# Patient Record
Sex: MALE | Race: WHITE | NOT HISPANIC OR LATINO | URBAN - METROPOLITAN AREA
[De-identification: names, ages, dates, MRNs, and addresses within clinical notes are randomized per-mention and may not be internally consistent; named-entity substitution may affect disease eponyms.]

---

## 2021-01-15 DIAGNOSIS — Z23 NEED FOR VACCINATION: ICD-10-CM

## 2022-01-31 ENCOUNTER — TELEPHONE (OUTPATIENT)
Dept: HEALTH INFORMATION MANAGEMENT | Facility: OTHER | Age: 77
End: 2022-01-31

## 2022-03-11 ENCOUNTER — TELEPHONE (OUTPATIENT)
Dept: SCHEDULING | Facility: IMAGING CENTER | Age: 77
End: 2022-03-11

## 2022-04-21 ENCOUNTER — TELEPHONE (OUTPATIENT)
Dept: MEDICAL GROUP | Facility: PHYSICIAN GROUP | Age: 77
End: 2022-04-21
Payer: MEDICARE

## 2022-04-27 ENCOUNTER — TELEPHONE (OUTPATIENT)
Dept: HEMATOLOGY ONCOLOGY | Facility: MEDICAL CENTER | Age: 77
End: 2022-04-27

## 2022-04-27 ENCOUNTER — OFFICE VISIT (OUTPATIENT)
Dept: MEDICAL GROUP | Facility: PHYSICIAN GROUP | Age: 77
End: 2022-04-27
Payer: MEDICARE

## 2022-04-27 VITALS
DIASTOLIC BLOOD PRESSURE: 68 MMHG | HEART RATE: 69 BPM | HEIGHT: 73 IN | TEMPERATURE: 98.2 F | BODY MASS INDEX: 23.99 KG/M2 | OXYGEN SATURATION: 98 % | WEIGHT: 181 LBS | SYSTOLIC BLOOD PRESSURE: 128 MMHG

## 2022-04-27 DIAGNOSIS — Z76.89 ENCOUNTER TO ESTABLISH CARE: ICD-10-CM

## 2022-04-27 DIAGNOSIS — Z72.0 TOBACCO USE: ICD-10-CM

## 2022-04-27 DIAGNOSIS — Z13.6 SCREENING FOR CARDIOVASCULAR CONDITION: ICD-10-CM

## 2022-04-27 DIAGNOSIS — Z12.11 COLON CANCER SCREENING: ICD-10-CM

## 2022-04-27 DIAGNOSIS — B02.23 POST-HERPETIC POLYNEUROPATHY: ICD-10-CM

## 2022-04-27 DIAGNOSIS — Z23 NEED FOR VACCINATION: ICD-10-CM

## 2022-04-27 PROCEDURE — G0009 ADMIN PNEUMOCOCCAL VACCINE: HCPCS | Performed by: NURSE PRACTITIONER

## 2022-04-27 PROCEDURE — 99204 OFFICE O/P NEW MOD 45 MIN: CPT | Mod: 25 | Performed by: NURSE PRACTITIONER

## 2022-04-27 PROCEDURE — 90732 PPSV23 VACC 2 YRS+ SUBQ/IM: CPT | Performed by: NURSE PRACTITIONER

## 2022-04-27 RX ORDER — GABAPENTIN 100 MG/1
100 CAPSULE ORAL 2 TIMES DAILY PRN
Qty: 15 CAPSULE | Refills: 0 | Status: SHIPPED | OUTPATIENT
Start: 2022-04-27 | End: 2022-05-02

## 2022-04-27 RX ORDER — VALACYCLOVIR HYDROCHLORIDE 1 G/1
1000 TABLET, FILM COATED ORAL 2 TIMES DAILY
Qty: 14 TABLET | Refills: 0 | Status: SHIPPED | OUTPATIENT
Start: 2022-04-27 | End: 2022-05-17 | Stop reason: SDUPTHER

## 2022-04-27 ASSESSMENT — ENCOUNTER SYMPTOMS
SPUTUM PRODUCTION: 0
NEUROLOGICAL NEGATIVE: 1
SHORTNESS OF BREATH: 0
MUSCULOSKELETAL NEGATIVE: 1
COUGH: 0
GASTROINTESTINAL NEGATIVE: 1
CONSTITUTIONAL NEGATIVE: 1
EYES NEGATIVE: 1
PSYCHIATRIC NEGATIVE: 1
FEVER: 0
PALPITATIONS: 0

## 2022-04-27 ASSESSMENT — PATIENT HEALTH QUESTIONNAIRE - PHQ9: CLINICAL INTERPRETATION OF PHQ2 SCORE: 0

## 2022-04-27 NOTE — ASSESSMENT & PLAN NOTE
Initial rash outbreak 12/2021; no treatment received at the time. No open lesions visualized today, he does have a significant area of scarring from where rash was. We discussed completing course of valtrex and trial of low dose gabapentin for neuropathic pain. May need to extend gabapentin if pain is lingering.

## 2022-04-27 NOTE — PROGRESS NOTES
Subjective:     CC:    Chief Complaint   Patient presents with   • Establish Care     Shingles x 4 months         HISTORY OF THE PRESENT ILLNESS: Patient is a pleasant 76 y.o. male, here today to establish care. Prior PCP was none. He has no active complaints today. He has never been on prescription medications. He eats healthy and stays active with walking.  The below problems were discussed/reviewed at this visit:    Problem   Post-Herpetic Polyneuropathy    Reports initial shingles rash across left lower torso in 12/2021; he went to  and states did not receive any treatment at the time. States burning pain is improving but still feels it anterior part of torso.     Tobacco Use    59 pack year smoker. He is down to 2 cigs/day (at most he smoked 1 pack/day in the past). He expresses need to quit. States he tried nicotine patch before but it gave him bad headache and nightmares.  - counseled/encouraged cessation; educated on proper use of patches; recommend he use lowest dosing since he only smokes 2 cigs/day now; remove patch before bedtime to prevent overdose of nicotine which can cause headaches and disrupted sleep  - he is concerned about lung damage; he does meet criteria for lung cancer screen so I have sent in referral          Current Outpatient Medications Ordered in Epic   Medication Sig Dispense Refill   • valacyclovir (VALTREX) 1 GM Tab Take 1 Tablet by mouth 2 times a day for 7 days. 14 Tablet 0   • gabapentin (NEURONTIN) 100 MG Cap Take 1 Capsule by mouth 2 times a day as needed (painful rash) for up to 5 days. 15 Capsule 0     No current Epic-ordered facility-administered medications on file.        No past surgical history on file.     Allergies:  Patient has no known allergies.    Health Maintenance: Completed    ROS:   Review of Systems   Constitutional: Negative.  Negative for fever and malaise/fatigue.   HENT: Negative.    Eyes: Negative.    Respiratory: Negative for cough, sputum production  "and shortness of breath.    Cardiovascular: Negative for chest pain, palpitations and leg swelling.   Gastrointestinal: Negative.    Genitourinary: Negative.    Musculoskeletal: Negative.    Skin:        Painful rash left torso   Neurological: Negative.    Endo/Heme/Allergies: Negative.    Psychiatric/Behavioral: Negative.        Objective:     Exam: /68 (BP Location: Left arm, Patient Position: Sitting, BP Cuff Size: Adult)   Pulse 69   Temp 36.8 °C (98.2 °F) (Temporal)   Ht 1.854 m (6' 1\")   Wt 82.1 kg (181 lb)   SpO2 98%  Body mass index is 23.88 kg/m².    Physical Exam  Constitutional:       Appearance: Normal appearance.   Cardiovascular:      Rate and Rhythm: Normal rate and regular rhythm.      Pulses: Normal pulses.      Heart sounds: Normal heart sounds.   Pulmonary:      Effort: Pulmonary effort is normal.      Breath sounds: Normal breath sounds.   Musculoskeletal:         General: Normal range of motion.      Cervical back: Normal range of motion and neck supple.      Right lower leg: No edema.      Left lower leg: No edema.   Skin:     General: Skin is warm and dry.      Comments: Healing shingles rash across left lower torso; there are no visible open lesions; scarring noted    Neurological:      General: No focal deficit present.      Mental Status: He is alert and oriented to person, place, and time.   Psychiatric:         Mood and Affect: Mood normal.         Behavior: Behavior normal.         Thought Content: Thought content normal.         Judgment: Judgment normal.       Assessment & Plan:   76 y.o. male with the following -    Problem List Items Addressed This Visit     Post-herpetic polyneuropathy     Initial rash outbreak 12/2021; no treatment received at the time. No open lesions visualized today, he does have a significant area of scarring from where rash was. We discussed completing course of valtrex and trial of low dose gabapentin for neuropathic pain. May need to extend " gabapentin if pain is lingering.          Relevant Medications    valacyclovir (VALTREX) 1 GM Tab    gabapentin (NEURONTIN) 100 MG Cap    Tobacco use    Relevant Orders    REFERRAL TO LUNG CANCER SCREENING PROGRAM      Other Visit Diagnoses     Need for vaccination        I have placed the below orders and discussed them with an approved delegating provider.  The MA is performing the below orders under the direction of Dr Fonseca    Relevant Orders    Pneumovax Vaccine (PPSV23) (Completed)    Colon cancer screening        Relevant Orders    COLOGUARD (FIT DNA)    Screening for cardiovascular condition        Relevant Orders    Comp Metabolic Panel    Lipid Profile    Encounter to establish care          - valacyclovir (VALTREX) 1 GM Tab; Take 1 Tablet by mouth 2 times a day for 7 days.  Dispense: 14 Tablet; Refill: 0  - gabapentin (NEURONTIN) 100 MG Cap; Take 1 Capsule by mouth 2 times a day as needed (painful rash) for up to 5 days.  Dispense: 15 Capsule; Refill: 0    Educated in proper administration of medication(s) ordered today including safety, possible SE, risks, benefits, rationale and alternatives to therapy.     Return in about 4 weeks (around 5/25/2022) for shingles follow up.    Please note that this dictation was created using voice recognition software. I have made every reasonable attempt to correct obvious errors, but I expect that there are errors of grammar and possibly content that I did not discover before finalizing the note.

## 2022-04-30 ENCOUNTER — HOSPITAL ENCOUNTER (OUTPATIENT)
Dept: LAB | Facility: MEDICAL CENTER | Age: 77
End: 2022-04-30
Attending: NURSE PRACTITIONER
Payer: MEDICARE

## 2022-04-30 DIAGNOSIS — Z13.6 SCREENING FOR CARDIOVASCULAR CONDITION: ICD-10-CM

## 2022-04-30 LAB
ALBUMIN SERPL BCP-MCNC: 4.4 G/DL (ref 3.2–4.9)
ALBUMIN/GLOB SERPL: 1.6 G/DL
ALP SERPL-CCNC: 90 U/L (ref 30–99)
ALT SERPL-CCNC: 13 U/L (ref 2–50)
ANION GAP SERPL CALC-SCNC: 9 MMOL/L (ref 7–16)
AST SERPL-CCNC: 18 U/L (ref 12–45)
BILIRUB SERPL-MCNC: 0.5 MG/DL (ref 0.1–1.5)
BUN SERPL-MCNC: 19 MG/DL (ref 8–22)
CALCIUM SERPL-MCNC: 9.5 MG/DL (ref 8.5–10.5)
CHLORIDE SERPL-SCNC: 102 MMOL/L (ref 96–112)
CHOLEST SERPL-MCNC: 190 MG/DL (ref 100–199)
CO2 SERPL-SCNC: 25 MMOL/L (ref 20–33)
CREAT SERPL-MCNC: 1.07 MG/DL (ref 0.5–1.4)
FASTING STATUS PATIENT QL REPORTED: NORMAL
GFR SERPLBLD CREATININE-BSD FMLA CKD-EPI: 71 ML/MIN/1.73 M 2
GLOBULIN SER CALC-MCNC: 2.7 G/DL (ref 1.9–3.5)
GLUCOSE SERPL-MCNC: 84 MG/DL (ref 65–99)
HDLC SERPL-MCNC: 52 MG/DL
LDLC SERPL CALC-MCNC: 123 MG/DL
POTASSIUM SERPL-SCNC: 4.9 MMOL/L (ref 3.6–5.5)
PROT SERPL-MCNC: 7.1 G/DL (ref 6–8.2)
SODIUM SERPL-SCNC: 136 MMOL/L (ref 135–145)
TRIGL SERPL-MCNC: 76 MG/DL (ref 0–149)

## 2022-04-30 PROCEDURE — 80061 LIPID PANEL: CPT

## 2022-04-30 PROCEDURE — 80053 COMPREHEN METABOLIC PANEL: CPT

## 2022-04-30 PROCEDURE — 36415 COLL VENOUS BLD VENIPUNCTURE: CPT

## 2022-05-05 ENCOUNTER — OFFICE VISIT (OUTPATIENT)
Dept: HEMATOLOGY ONCOLOGY | Facility: MEDICAL CENTER | Age: 77
End: 2022-05-05
Payer: MEDICARE

## 2022-05-05 VITALS
RESPIRATION RATE: 16 BRPM | HEART RATE: 87 BPM | DIASTOLIC BLOOD PRESSURE: 58 MMHG | OXYGEN SATURATION: 97 % | TEMPERATURE: 98 F | SYSTOLIC BLOOD PRESSURE: 120 MMHG | WEIGHT: 182.87 LBS | HEIGHT: 73 IN | BODY MASS INDEX: 24.24 KG/M2

## 2022-05-05 DIAGNOSIS — F17.210 CIGARETTE SMOKER: ICD-10-CM

## 2022-05-05 PROCEDURE — G0296 VISIT TO DETERM LDCT ELIG: HCPCS | Performed by: NURSE PRACTITIONER

## 2022-05-05 ASSESSMENT — ENCOUNTER SYMPTOMS
SPUTUM PRODUCTION: 0
WHEEZING: 0
SHORTNESS OF BREATH: 0
WEIGHT LOSS: 0
HEMOPTYSIS: 0
COUGH: 1

## 2022-05-05 NOTE — PROGRESS NOTES
"Subjective     Gene DANNY Morgan is a 76 y.o. male who presents with Lung Cancer Screening Program Prescreen ([facility]/ [facility]/ Smoking hx/ Leisa Browne)            HPI   Patient seen today for initial lung cancer screening visit. Patient referred by PCP, Dr. John Paul Torres, for lung cancer screening shared decision making visit.    The patient meets eligibility criteria including age, smoking history (30+ pack years), if former smoker, quit in the last 15 years, and absence of signs or symptoms of lung cancer.    - Age - 76  - Smoking history - Patient has smoked for 59 years at an average of 1 ppd = 59 pack year smoking history.  - Current smoking status - current smoker  - No symptoms of lung cancer and no previous history of lung cancer       Review of Systems   Constitutional: Negative for malaise/fatigue and weight loss.   Respiratory: Positive for cough. Negative for hemoptysis, sputum production, shortness of breath and wheezing.      No Known Allergies        No current outpatient medications on file prior to visit.     No current facility-administered medications on file prior to visit.              Objective     /58 (BP Location: Right arm, Patient Position: Sitting, BP Cuff Size: Adult)   Pulse 87   Temp 36.7 °C (98 °F) (Temporal)   Resp 16   Ht 1.854 m (6' 0.99\")   Wt 83 kg (182 lb 14 oz)   SpO2 97%   BMI 24.13 kg/m²      Physical Exam  Vitals reviewed.   Constitutional:       General: He is not in acute distress.     Appearance: He is well-developed. He is not diaphoretic.   Cardiovascular:      Rate and Rhythm: Normal rate and regular rhythm.      Heart sounds: Normal heart sounds. No murmur heard.    No friction rub. No gallop.   Pulmonary:      Effort: Pulmonary effort is normal. No respiratory distress.      Breath sounds: Normal breath sounds. No wheezing.   Musculoskeletal:         General: Normal range of motion.   Skin:     General: Skin is warm and dry.   Neurological:      Mental Status: " He is alert and oriented to person, place, and time.              Assessment & Plan        1. Cigarette smoker  CT-LUNG CANCER-SCREENING       We conducted a shared decision-making process using a decision aid. We reviewed benefits and harms of screening, including false positives and potential need for additional diagnostic testing, the possibility of over diagnosis, and total radiation exposure.    We discussed the importance of adhering to annual LDCT screening. We also discussed the impact of comorbities on the patient's the ability or willingness to undergo diagnostic procedure(s) and treatment.    Counseling on the importance of maintaining cigarette smoking abstinence if former smoker; or the importance of smoking cessation if current smoker and, if appropriate, furnishing of information about tobacco cessation interventions. I provided patient with smoking cessation materials and resources within [facility] and the community. Patient appreciative of the resources.    Based on our discussion, we have decided to begin annual lung cancer screening starting now.

## 2022-05-16 ENCOUNTER — HOSPITAL ENCOUNTER (OUTPATIENT)
Dept: RADIOLOGY | Facility: MEDICAL CENTER | Age: 77
End: 2022-05-16
Attending: NURSE PRACTITIONER
Payer: MEDICARE

## 2022-05-16 DIAGNOSIS — F17.210 CIGARETTE SMOKER: ICD-10-CM

## 2022-05-16 PROCEDURE — 71271 CT THORAX LUNG CANCER SCR C-: CPT

## 2022-05-17 ENCOUNTER — OFFICE VISIT (OUTPATIENT)
Dept: MEDICAL GROUP | Facility: PHYSICIAN GROUP | Age: 77
End: 2022-05-17
Payer: MEDICARE

## 2022-05-17 ENCOUNTER — TELEPHONE (OUTPATIENT)
Dept: HEMATOLOGY ONCOLOGY | Facility: MEDICAL CENTER | Age: 77
End: 2022-05-17

## 2022-05-17 VITALS
SYSTOLIC BLOOD PRESSURE: 126 MMHG | BODY MASS INDEX: 24.79 KG/M2 | DIASTOLIC BLOOD PRESSURE: 78 MMHG | HEART RATE: 83 BPM | TEMPERATURE: 98 F | WEIGHT: 183 LBS | OXYGEN SATURATION: 96 % | HEIGHT: 72 IN

## 2022-05-17 DIAGNOSIS — B02.23 POST-HERPETIC POLYNEUROPATHY: ICD-10-CM

## 2022-05-17 DIAGNOSIS — Z72.0 TOBACCO USE: ICD-10-CM

## 2022-05-17 PROCEDURE — 99213 OFFICE O/P EST LOW 20 MIN: CPT | Performed by: NURSE PRACTITIONER

## 2022-05-17 RX ORDER — VALACYCLOVIR HYDROCHLORIDE 1 G/1
1000 TABLET, FILM COATED ORAL 2 TIMES DAILY
Qty: 14 TABLET | Refills: 0 | Status: SHIPPED | OUTPATIENT
Start: 2022-05-17 | End: 2022-05-24

## 2022-05-17 RX ORDER — GABAPENTIN 100 MG/1
100 CAPSULE ORAL 2 TIMES DAILY PRN
Qty: 60 CAPSULE | Refills: 0 | Status: SHIPPED | OUTPATIENT
Start: 2022-05-17 | End: 2022-06-28 | Stop reason: SDUPTHER

## 2022-05-17 NOTE — TELEPHONE ENCOUNTER
Patient returned call regarding results of LDCT exam performed on 5/16/22.    Notified him that the results showed scattered micronodules that had a benign (or non cancer) appearance.    To make sure these nodule(s) are benign, and remain unchanged, we recommend a follow-up low-dose chest CT in 12 months, which will be ordered per PCP/referring provider.    Patient informed of no incidental findings.  Patient agrees to all recommendations.    Referring provider, Dr. Torres, notified of results and incidental findings per this correspondence.    Health maintenance updated and patient sent lung cancer screening result letter.        CT-LUNG CANCER-SCREENING    Result Date: 5/16/2022 5/16/2022 12:19 PM HISTORY/REASON FOR EXAM:  lung cancer screening; lung cancer screening. High risk history, 59 pack year. Intermittent cough, current smoker. TECHNIQUE/EXAM DESCRIPTION AND NUMBER OF VIEWS: Lung cancer screening without contrast. Low dose noncontrast helical images were obtained of the chest from the lung apices through the costophrenic sulci utilizing thin collimation and intervals with reconstructed images sent to PACS in axial, coronal and sagittal planes. Low dose optimization technique was utilized for this CT exam including automated exposure control and adjustment of the mA and/or kV according to patient size. COMPARISON: None. FINDINGS: Scattered pulmonary micronodules. Central airways are patent and there is no bronchiectasis. No pleural effusion. No consolidation. Thyroid is unremarkable. There is no mediastinal or hilar adenopathy. No axillary adenopathy. Cardiac chambers are normal in size. No pericardial effusion. There are coronary artery calcifications. There are aortic vascular calcifications. No aneurysm. There is no upper abdominal adenopathy. No suspicious osseous abnormality.     Scattered pulmonary micronodules. Lung RADS: 2 - Benign Appearance or Behavior Nodules with a very low likelihood of  becoming a clinically active cancer due to size or lack of growth Findings: solid nodule(s): less than 6 mm or new less than 4 mm part solid nodule(s): less than 6 mm total diameter on baseline screening non solid nodule(s) (GGN): less than 30 mm OR greater than or equal to 30 mm and unchanged or slowly growing category 3 or 4 nodules unchanged for greater than or equal to 3 months perifissural nodule(s) less than 10 mm Management: Continue annual screening with LDCT in 12 months

## 2022-05-17 NOTE — TELEPHONE ENCOUNTER
Attempted to reach pt by phone, per request of Nusrat KAMARA, to review recent LDCT scan results.  Pt did not answer, left detailed message with contact info & request to return call to office.    Staff:  Please transfer to an RN    RN:  Review LDCT (RADS 2) results from 5/16/22 with pt.  Route encounter notes to pt's referring PCP, Leisa Browne DNP as well as Nusrat KAMARA.

## 2022-05-17 NOTE — LETTER
. [address]  [place] [address]  P [phone]  F [phone]         Date: May 18, 2022    Jose Morgan  [address] [place] [address]  [address]  [facility] [address]    Re:  Low-dose chest CT performed on 5/16/2022     Medical Record Number: [id number]    Dear Jose,    We are writing to let you know that the results of your recent low-dose chest CT (LDCT) examination shows one or more lung nodule(s) which are likely benign (not cancer).  Lung nodules are very common and many people without cancer have these nodules.  To make sure these nodule(s) are benign, and remain unchanged, your radiologist recommends you have another low-dose chest CT on or around 5/16/2023 (12 month follow-up). In the event that any additional “incidental” findings were identified from this exam, we have communicated back to your primary care provider for follow-up.    Here are some other important points you should know:  • Your low-dose Chest CT report has been sent to your referring or primary health care provider and is available to participants in [facility].  As a part of our Lung Cancer Screening program we will remind you and your referring health care provider when your next LDCT screening is due.  • Although low-dose chest CT is very effective at finding lung cancer early, it cannot find all lung cancers. If you develop any new symptoms such as shortness of breath, chest pain, or coughing up blood, please call your doctor.  • Please keep in mind that good health involves quitting smoking (for help, call [facility]uit Tobacco program at [phone]), an annual physical exam, and continued screening with low-dose chest CT.    Thank you for participating in the Lung Cancer Screening program. If you have any questions about this letter or our program, please call our Nurse at [phone].    Sincerely,  Catia Prince MD, [facility]  Medical Director [facility] Lung Cancer Screening Program

## 2022-05-17 NOTE — PROGRESS NOTES
Subjective:     CC:   Chief Complaint   Patient presents with   • Herpes Zoster     Requesting medication refill         HPI:   Patient is a 76 y.o. male here today for evaluation and management of:    Problem   Post-Herpetic Polyneuropathy    Reported initial shingles rash across left lower torso in 12/2021; he went to  and stated did not receive any treatment at the time. Reported burning pain was still present anterior part of torso & he was started on 7 day valtrex with prn gabapentin on 4/27/2022.   He returns today, states rash significantly improved when he started medications. There is some residual burning and intermittent itching still present.  - we will extend valtrex for 7 more days; will also refill gabapentin prn rash pain     Tobacco Use    59 pack year smoker. He is down to 2 cigs/day (at most he smoked 1 pack/day in the past). He expresses need to quit. States he tried nicotine patch before but it gave him bad headache and nightmares.  - counseled/encouraged cessation; educated on proper use of patches; recommend he use lowest dosing since he only smokes 2 cigs/day now; remove patch before bedtime to prevent overdose of nicotine which can cause headaches and disrupted sleep  - he was concerned about lung damage at our last visit; he met criteria for lung cancer screen & I sent referral; he completed LDLCT yesterday; results reviewed today with patient- scattered micronodules noted; recommendation to repeat scan in 12 months      5/16/2022  FINDINGS:  Scattered pulmonary micronodules. Central airways are patent and there is no bronchiectasis. No pleural effusion. No consolidation. Thyroid is unremarkable. There is no mediastinal or hilar adenopathy. No axillary adenopathy. Cardiac chambers are normal   in size. No pericardial effusion. There are coronary artery calcifications. There are aortic vascular calcifications. No aneurysm. There is no upper abdominal adenopathy. No suspicious osseous  abnormality.     IMPRESSION:   Scattered pulmonary micronodules.     Lung RADS: 2 - Benign Appearance or Behavior  Nodules with a very low likelihood of becoming a clinically active cancer due to size or lack of growth     Findings: solid nodule(s): less than 6 mm or new less than 4 mm  part solid nodule(s): less than 6 mm total diameter on baseline screening  non solid nodule(s) (GGN): less than 30 mm OR greater than or equal to 30 mm and unchanged or slowly growing  category 3 or 4 nodules unchanged for greater than or equal to 3 months  perifissural nodule(s) less than 10 mm     Management: Continue annual screening with LDCT in 12 months          Patient Active Problem List   Diagnosis   • Post-herpetic polyneuropathy   • Tobacco use       No past medical history on file.     No past surgical history on file.     No current outpatient medications on file prior to visit.     No current facility-administered medications on file prior to visit.        Health Maintenance: Completed  - shingles vaccine on hold till rash symptoms resolve    ROS: per HPI  No CP, SOB, cough, fever    Objective:     Exam:  /78 (BP Location: Left arm, Patient Position: Sitting, BP Cuff Size: Adult)   Pulse 83   Temp 36.7 °C (98 °F) (Temporal)   Ht 1.829 m (6')   Wt 83 kg (183 lb)   SpO2 96%   BMI 24.82 kg/m²  Body mass index is 24.82 kg/m².    Physical Exam  Constitutional:       Appearance: Normal appearance.   Cardiovascular:      Rate and Rhythm: Normal rate and regular rhythm.      Pulses: Normal pulses.      Heart sounds: Normal heart sounds.   Pulmonary:      Effort: Pulmonary effort is normal.      Breath sounds: Normal breath sounds.   Skin:     General: Skin is warm and dry.      Comments: Healing shingles rash across left lower torso; there are no visible open lesions; scarring noted    Neurological:      Mental Status: He is alert.       Labs: reviewed with patient    Assessment & Plan:     76 y.o. male with the  following -     Problem List Items Addressed This Visit     Post-herpetic polyneuropathy    Relevant Medications    valacyclovir (VALTREX) 1 GM Tab    gabapentin (NEURONTIN) 100 MG Cap    Tobacco use          Medications Prescribed Today:  1. Post-herpetic polyneuropathy  - valacyclovir (VALTREX) 1 GM Tab; Take 1 Tablet by mouth 2 times a day for 7 days.  Dispense: 14 Tablet; Refill: 0  - gabapentin (NEURONTIN) 100 MG Cap; Take 1 Capsule by mouth 2 times a day as needed (itchy/painful rash).  Dispense: 60 Capsule; Refill: 0    Return in about 1 year (around 5/17/2023) for Annual Visit; sooner if rash pain still lingering.    Please note that this dictation was created using voice recognition software. I have made every reasonable attempt to correct obvious errors, but I expect that there are errors of grammar and possibly content that I did not discover before finalizing the note.

## 2022-05-25 ENCOUNTER — TELEPHONE (OUTPATIENT)
Dept: MEDICAL GROUP | Facility: PHYSICIAN GROUP | Age: 77
End: 2022-05-25
Payer: MEDICARE

## 2022-05-25 NOTE — TELEPHONE ENCOUNTER
Future Appointments       Provider Department Center    6/1/2022 12:00 PM Leisa Browne D.N.P. [facility]        ESTABLISHED PATIENT PRE-VISIT PLANNING     Patient was NOT contacted to complete PVP.     Note: Patient will not be contacted if there is no indication to call.     1.  Reviewed notes from the last few office visits within the medical group: Yes, LOV 05/17/2022    2.  If any orders were placed at last visit or intended to be done for this visit (i.e. 6 mos follow-up), do we have Results/Consult Notes?         •  Labs - Cologuard was just ordered for patient  Note: If patient appointment is for lab review and patient did not complete labs, check with provider if OK to reschedule patient until labs completed.       •  Imaging - Imaging ordered, completed and results are in chart.       •  Referrals - No referrals were ordered at last office visit.    3. Is this appointment scheduled as a Hospital Follow-Up? No    4.  Immunizations were updated in Epic using Reconcile Outside Information activity? Yes    5.  Patient is due for the following Health Maintenance Topics:   Health Maintenance Due   Topic Date Due   • Annual Wellness Visit  Never done   • IMM ZOSTER VACCINES (1 of 2) Never done   • COVID-19 Vaccine (3 - Booster for Pfizer series) 09/12/2021     6.  AHA (Pulse8) form printed for Provider? N/A

## 2022-06-28 ENCOUNTER — OFFICE VISIT (OUTPATIENT)
Dept: MEDICAL GROUP | Facility: PHYSICIAN GROUP | Age: 77
End: 2022-06-28
Payer: MEDICARE

## 2022-06-28 VITALS
HEIGHT: 74 IN | TEMPERATURE: 98.1 F | WEIGHT: 186 LBS | BODY MASS INDEX: 23.87 KG/M2 | HEART RATE: 74 BPM | DIASTOLIC BLOOD PRESSURE: 60 MMHG | OXYGEN SATURATION: 98 % | SYSTOLIC BLOOD PRESSURE: 118 MMHG

## 2022-06-28 DIAGNOSIS — B02.23 POST-HERPETIC POLYNEUROPATHY: ICD-10-CM

## 2022-06-28 PROCEDURE — 99214 OFFICE O/P EST MOD 30 MIN: CPT | Performed by: NURSE PRACTITIONER

## 2022-06-28 RX ORDER — GABAPENTIN 100 MG/1
300 CAPSULE ORAL 2 TIMES DAILY PRN
Qty: 90 CAPSULE | Refills: 0 | Status: SHIPPED | OUTPATIENT
Start: 2022-06-28 | End: 2022-10-08

## 2022-06-28 RX ORDER — VALACYCLOVIR HYDROCHLORIDE 1 G/1
1000 TABLET, FILM COATED ORAL 3 TIMES DAILY
Qty: 30 TABLET | Refills: 0 | Status: SHIPPED | OUTPATIENT
Start: 2022-06-28 | End: 2022-07-08

## 2022-06-28 NOTE — ASSESSMENT & PLAN NOTE
Initial rash outbreak 12/2021; started 7 day valtrex on 4/27/2022, redosed on 5/17/2022.  - rash has resolved with visible scarring; he continues to have left side pain in that area; will increase dose of valtrex to TID & treat for 10 days this time; will also increase gabapentin to 300mg BID (from 200) prn  - re-assess in 2 weeks; may need to remain on long term gabapentin

## 2022-06-28 NOTE — PROGRESS NOTES
"Subjective:     CC:   Chief Complaint   Patient presents with   • GI Problem     sharp pain towards left side X3 months         HPI:   Patient is a 77 y.o. male here today for evaluation and management of:    Problem   Post-Herpetic Polyneuropathy    Initial shingles rash across left lower torso in 12/2021; he went to  and stated did not receive any treatment at the time. Reported burning pain was still present anterior part of torso & he was started on 7 day valtrex with prn gabapentin on 4/27/2022. Rash improved on 5/17/2022 with residual burning and intermittent itching, valtrex was extended with gabapentin       Patient Active Problem List   Diagnosis   • Post-herpetic polyneuropathy   • Tobacco use       No past medical history on file.     No past surgical history on file.     No current outpatient medications on file prior to visit.     No current facility-administered medications on file prior to visit.        Health Maintenance: Completed    ROS:  ROS    Objective:     Exam:  /60 (BP Location: Left arm, Patient Position: Sitting, BP Cuff Size: Adult)   Pulse 74   Temp 36.7 °C (98.1 °F) (Temporal)   Ht 1.88 m (6' 2\")   Wt 84.4 kg (186 lb)   SpO2 98%   BMI 23.88 kg/m²  Body mass index is 23.88 kg/m².    Physical Exam  Constitutional:       Appearance: Normal appearance.   Cardiovascular:      Rate and Rhythm: Normal rate and regular rhythm.      Pulses: Normal pulses.      Heart sounds: Normal heart sounds.   Pulmonary:      Effort: Pulmonary effort is normal.      Breath sounds: Normal breath sounds.   Skin:     General: Skin is warm and dry.      Comments: Healing shingles rash across left lower torso; there are no visible open lesions; scarring noted    Neurological:      Mental Status: He is alert.       Assessment & Plan:     77 y.o. male with the following -     Problem List Items Addressed This Visit     Post-herpetic polyneuropathy     Initial rash outbreak 12/2021; started 7 day valtrex " on 4/27/2022, redosed on 5/17/2022.  - rash has resolved with visible scarring; he continues to have left side pain in that area; will increase dose of valtrex to TID & treat for 10 days this time; will also increase gabapentin to 300mg BID (from 200) prn  - re-assess in 2 weeks; may need to remain on long term gabapentin           Relevant Medications    gabapentin (NEURONTIN) 100 MG Cap    valacyclovir (VALTREX) 1 GM Tab        Medications Prescribed Today:  1. Post-herpetic polyneuropathy  - gabapentin (NEURONTIN) 100 MG Cap; Take 3 Capsules by mouth 2 times a day as needed (itchy/painful rash).  Dispense: 90 Capsule; Refill: 0  - valacyclovir (VALTREX) 1 GM Tab; Take 1 Tablet by mouth 3 times a day for 10 days.  Dispense: 30 Tablet; Refill: 0    Educated in proper administration of medication(s) ordered today including safety, possible SE, risks, benefits, rationale and alternatives to therapy.     Return in about 2 weeks (around 7/12/2022) for left rash pain.    Please note that this dictation was created using voice recognition software. I have made every reasonable attempt to correct obvious errors, but I expect that there are errors of grammar and possibly content that I did not discover before finalizing the note.

## 2022-07-08 ENCOUNTER — OFFICE VISIT (OUTPATIENT)
Dept: MEDICAL GROUP | Facility: PHYSICIAN GROUP | Age: 77
End: 2022-07-08
Payer: MEDICARE

## 2022-07-08 VITALS
TEMPERATURE: 98.5 F | DIASTOLIC BLOOD PRESSURE: 88 MMHG | BODY MASS INDEX: 24.13 KG/M2 | OXYGEN SATURATION: 97 % | HEIGHT: 74 IN | SYSTOLIC BLOOD PRESSURE: 144 MMHG | WEIGHT: 188 LBS | HEART RATE: 85 BPM

## 2022-07-08 DIAGNOSIS — B02.23 POST-HERPETIC POLYNEUROPATHY: ICD-10-CM

## 2022-07-08 PROCEDURE — 99213 OFFICE O/P EST LOW 20 MIN: CPT | Performed by: NURSE PRACTITIONER

## 2022-07-08 NOTE — PROGRESS NOTES
"Subjective:     CC:   Chief Complaint   Patient presents with   • Rash     Follow up on shingles and abd pain         HPI:   Patient is a 77 y.o. male here today for evaluation and management of:    Problem   Post-Herpetic Polyneuropathy    Initial shingles rash across left lower torso in 12/2021; he went to  and stated did not receive any treatment at the time. Reported burning pain was still present anterior part of torso & he was started on 7 day valtrex with prn gabapentin on 4/27/2022. Rash improved on 5/17/2022 with residual burning and intermittent itching, valtrex was extended with gabapentin; he continued to have left side pain around area where rash was and valtrex & gabapentin dosing was increased on 6/28/2022       Patient Active Problem List   Diagnosis   • Post-herpetic polyneuropathy   • Tobacco use     No past medical history on file.     No past surgical history on file.     Current Outpatient Medications on File Prior to Visit   Medication Sig Dispense Refill   • gabapentin (NEURONTIN) 100 MG Cap Take 3 Capsules by mouth 2 times a day as needed (itchy/painful rash). 90 Capsule 0   • valacyclovir (VALTREX) 1 GM Tab Take 1 Tablet by mouth 3 times a day for 10 days. 30 Tablet 0     No current facility-administered medications on file prior to visit.        Objective:     Exam:  BP (!) 144/88 (BP Location: Left arm, Patient Position: Sitting, BP Cuff Size: Adult)   Pulse 85   Temp 36.9 °C (98.5 °F) (Temporal)   Ht 1.88 m (6' 2\")   Wt 85.3 kg (188 lb)   SpO2 97%   BMI 24.14 kg/m²  Body mass index is 24.14 kg/m².    Physical Exam  Constitutional:       Appearance: Normal appearance.   Cardiovascular:      Rate and Rhythm: Normal rate and regular rhythm.      Pulses: Normal pulses.      Heart sounds: Normal heart sounds.   Pulmonary:      Effort: Pulmonary effort is normal.      Breath sounds: Normal breath sounds.   Musculoskeletal:      Right lower leg: No edema.      Left lower leg: No edema. "   Skin:     General: Skin is warm and dry.   Neurological:      General: No focal deficit present.      Mental Status: He is alert and oriented to person, place, and time.       Assessment & Plan:     77 y.o. male with the following -     Problem List Items Addressed This Visit     Post-herpetic polyneuropathy     Initial rash outbreak 12/2021; started 7 day valtrex on 4/27/2022, redosed on 5/17/2022.  - rash has resolved with visible scarring; he continued to have left side pain in that area; & we increased dose of valtrex to TID x10 days on 6/28/2022; also increased gabapentin to 300mg BID (from 200) prn  - he returns today and starts left torso pain resolved the next day after he started the new dosing. He has 1 more day to complete valtrex and will use gabapentin prn.  - he will get his shingles vaccine once he completes valtrex and is pain free for a few days.                Return in about 3 months (around 10/8/2022) for Medicare Annual Visit.    Please note that this dictation was created using voice recognition software. I have made every reasonable attempt to correct obvious errors, but I expect that there are errors of grammar and possibly content that I did not discover before finalizing the note.

## 2022-07-08 NOTE — ASSESSMENT & PLAN NOTE
Initial rash outbreak 12/2021; started 7 day valtrex on 4/27/2022, redosed on 5/17/2022.  - rash has resolved with visible scarring; he continued to have left side pain in that area; & we increased dose of valtrex to TID x10 days on 6/28/2022; also increased gabapentin to 300mg BID (from 200) prn  - he returns today and starts left torso pain resolved the next day after he started the new dosing. He has 1 more day to complete valtrex and will use gabapentin prn.  - he will get his shingles vaccine once he completes valtrex and is pain free for a few days.

## 2022-08-31 ENCOUNTER — OFFICE VISIT (OUTPATIENT)
Dept: MEDICAL GROUP | Facility: PHYSICIAN GROUP | Age: 77
End: 2022-08-31
Payer: MEDICARE

## 2022-08-31 VITALS
TEMPERATURE: 98 F | DIASTOLIC BLOOD PRESSURE: 70 MMHG | SYSTOLIC BLOOD PRESSURE: 140 MMHG | HEIGHT: 72 IN | BODY MASS INDEX: 24.79 KG/M2 | OXYGEN SATURATION: 98 % | WEIGHT: 183 LBS | HEART RATE: 68 BPM

## 2022-08-31 DIAGNOSIS — R10.32 LEFT LOWER QUADRANT ABDOMINAL PAIN: ICD-10-CM

## 2022-08-31 PROCEDURE — 99213 OFFICE O/P EST LOW 20 MIN: CPT | Performed by: NURSE PRACTITIONER

## 2022-08-31 NOTE — PROGRESS NOTES
"Chief Complaint   Patient presents with    GI Problem     Stomach ache x2 months with black stool        HISTORY OF PRESENT ILLNESS: Jose Morgan is a 77 y.o. male established patient who presents today to discuss:  - 2 months left lower quadrant pain all the time; not constipated; about 2-3x/week stool is black in color, no visible blood. No difference in symptoms when he eats. He cut back on hot/spicey foods but no change in symptoms. Negative cologuard in 5/2022; denies nausea, vomiting, dizziness  - does not drink; has cut back smoking to 2 cigarettes/day.  - he had prolonged peripheral neuropathy on his right trunk area from shingles, this has since resolved    Current Outpatient Medications on File Prior to Visit   Medication Sig Dispense Refill    gabapentin (NEURONTIN) 100 MG Cap Take 3 Capsules by mouth 2 times a day as needed (itchy/painful rash). (Patient not taking: Reported on 8/31/2022) 90 Capsule 0     No current facility-administered medications on file prior to visit.       has no past medical history on file.     Allergies:Patient has no known allergies.    Health Maintenance: deferred  Review of Systems -included above  Exam:   BP (!) 140/70 (BP Location: Left arm, Patient Position: Sitting, BP Cuff Size: Adult)   Pulse 68   Temp 36.7 °C (98 °F) (Temporal)   Ht 1.82 m (5' 11.65\")   Wt 83 kg (183 lb)   SpO2 98%   Body mass index is 25.06 kg/m².   General:  Well nourished, well developed male in NAD, appropriate and cooperative with exam.  Lungs: Clear and equal with good air movement.  Normal effort. No rales, ronchi, or wheezing.  Cardiovascular: Regular rate and rhythm, S1, S2 without murmur. Pedal pulses 2+ bilaterally. No edema  Abdomen: Soft, round, normal BS. Some tenderness with palpation in LLQ. no CVAT  Skin: pink, warm, no rash or lesions. Cap refill < 3 seconds.    Assessment/Plan:  1. Left lower quadrant abdominal pain  LLQ abdominal pain x2 months with dark stools. No heart burn " or any other associated symptoms. He does not feel he is constipated. R/o GIB, GERD, gastritis. Will check KUB & stool occult. In interim he will start daily Nexium. Will see him next week to review imaging, lab & assess if any improvement with nexium. Plan for GI referral if symptoms are persisting.    - [id number] VIEW; Future  - OCCULT BLOOD FECES IMMUNOASSAY; Future    Follow up:  Return in about 1 week (around 9/7/2022) for abdominal pain.    Educated in proper administration of medication(s) ordered today including safety, possible SE, risks, benefits, rationale and alternatives to therapy.       Please note that this dictation was created using voice recognition software. I have made every reasonable attempt to correct obvious errors, but I expect that there are errors of grammar and possibly content that I did not discover before finalizing the note.

## 2022-09-01 ENCOUNTER — HOSPITAL ENCOUNTER (OUTPATIENT)
Dept: RADIOLOGY | Facility: MEDICAL CENTER | Age: 77
End: 2022-09-01
Attending: NURSE PRACTITIONER
Payer: MEDICARE

## 2022-09-01 ENCOUNTER — HOSPITAL ENCOUNTER (OUTPATIENT)
Facility: MEDICAL CENTER | Age: 77
End: 2022-09-01
Attending: NURSE PRACTITIONER
Payer: MEDICARE

## 2022-09-01 DIAGNOSIS — R10.32 LEFT LOWER QUADRANT ABDOMINAL PAIN: ICD-10-CM

## 2022-09-01 PROCEDURE — 82274 ASSAY TEST FOR BLOOD FECAL: CPT

## 2022-09-01 PROCEDURE — 74018 RADEX ABDOMEN 1 VIEW: CPT

## 2022-09-03 LAB — IMM ASSAY OCC BLD FITOB: POSITIVE

## 2022-09-30 ENCOUNTER — TELEPHONE (OUTPATIENT)
Dept: MEDICAL GROUP | Facility: PHYSICIAN GROUP | Age: 77
End: 2022-09-30
Payer: MEDICARE

## 2022-09-30 NOTE — TELEPHONE ENCOUNTER
Future Appointments         Provider Department Center    10/7/2022 2:00 PM (Arrive by 1:45 PM) Leisa Browne D.N.P. [facility]          ANNUAL WELLNESS VISIT PRE-VISIT PLANNING    1.  Reviewed notes from the last office visit: Yes, LOV 08/31/2022    2.  If any orders were ordered or intended to be done prior to visit (i.e. 6 mos follow-up), do we have results/consult notes or has patient scheduled?          Labs - Labs were not ordered at last office visit.  Note: If patient appointment is for lab review and patient did not complete labs, check with provider if OK to reschedule patient until labs completed.         Imaging - Imaging was not ordered at last office visit.         Referrals - No referrals were ordered at last office visit.    3.  Immunizations were updated in Epic using Reconcile Outside Information activity? Yes         Is patient due for Tdap? NO         Is patient due for Shingrix? NO    4.  Patient is due for the following Health Maintenance Topics:   Health Maintenance Due   Topic Date Due    Annual Wellness Visit  Never done    IMM HEP B VACCINE (1 of 3 - 3-dose series) Never done    COVID-19 Vaccine (3 - Booster for Pfizer series) 09/12/2021    IMM INFLUENZA (1) 09/01/2022       - Patient already has appointment scheduled for Annual Wellness Visit (AWV). Will discuss other immunizations with provider     5.  Reviewed/Updated the following with patient:          Preferred Pharmacy? Yes          Preferred Lab? Yes          Preferred Communication? Yes          Allergies? Yes          Medications? YES. Was Abstract Encounter opened and chart updated? YES          Social History? Yes          Family History (document living status of immediate family members and if + hx of  cancer, diabetes, hypertension, hyperlipidemia, heart attack, stroke) Yes    6.  Care Team Updated:          DME Company (gait device, O2, CPAP, etc.): NO          Other Specialists (eye doctor, derm, GYN,  cardiology, endo, etc): N\A    7.  Patient was advised: “This is a free wellness visit. The provider will screen for medical conditions to help you stay healthy. If you have other concerns to address you may be asked to discuss these at a separate visit or there may be an additional fee.”     8.  AHA (Puls8) form printed for Provider? N/A  Pt reminded of his check in time.

## 2022-10-07 ENCOUNTER — OFFICE VISIT (OUTPATIENT)
Dept: MEDICAL GROUP | Facility: PHYSICIAN GROUP | Age: 77
End: 2022-10-07
Payer: MEDICARE

## 2022-10-07 VITALS
TEMPERATURE: 97.7 F | BODY MASS INDEX: 25.9 KG/M2 | WEIGHT: 185 LBS | OXYGEN SATURATION: 97 % | DIASTOLIC BLOOD PRESSURE: 66 MMHG | SYSTOLIC BLOOD PRESSURE: 132 MMHG | HEART RATE: 77 BPM | HEIGHT: 71 IN

## 2022-10-07 DIAGNOSIS — Z72.0 TOBACCO USE: ICD-10-CM

## 2022-10-07 DIAGNOSIS — Z23 NEED FOR VACCINATION: ICD-10-CM

## 2022-10-07 DIAGNOSIS — R10.11 RIGHT UPPER QUADRANT ABDOMINAL PAIN: ICD-10-CM

## 2022-10-07 DIAGNOSIS — Z00.00 ENCOUNTER FOR ANNUAL WELLNESS VISIT (AWV) IN MEDICARE PATIENT: ICD-10-CM

## 2022-10-07 PROCEDURE — 99214 OFFICE O/P EST MOD 30 MIN: CPT | Mod: 25 | Performed by: NURSE PRACTITIONER

## 2022-10-07 PROCEDURE — G0439 PPPS, SUBSEQ VISIT: HCPCS | Performed by: NURSE PRACTITIONER

## 2022-10-07 PROCEDURE — G0008 ADMIN INFLUENZA VIRUS VAC: HCPCS | Performed by: NURSE PRACTITIONER

## 2022-10-07 PROCEDURE — 90662 IIV NO PRSV INCREASED AG IM: CPT | Performed by: NURSE PRACTITIONER

## 2022-10-07 RX ORDER — LANSOPRAZOLE 30 MG/1
30 CAPSULE, DELAYED RELEASE ORAL DAILY
Qty: 30 CAPSULE | Refills: 0 | Status: SHIPPED | OUTPATIENT
Start: 2022-10-07 | End: 2022-11-01

## 2022-10-07 SDOH — HEALTH STABILITY: PHYSICAL HEALTH: ON AVERAGE, HOW MANY DAYS PER WEEK DO YOU ENGAGE IN MODERATE TO STRENUOUS EXERCISE (LIKE A BRISK WALK)?: 2 DAYS

## 2022-10-07 SDOH — HEALTH STABILITY: PHYSICAL HEALTH: ON AVERAGE, HOW MANY MINUTES DO YOU ENGAGE IN EXERCISE AT THIS LEVEL?: 30 MIN

## 2022-10-07 SDOH — ECONOMIC STABILITY: HOUSING INSECURITY
IN THE LAST 12 MONTHS, WAS THERE A TIME WHEN YOU DID NOT HAVE A STEADY PLACE TO SLEEP OR SLEPT IN A SHELTER (INCLUDING NOW)?: NO

## 2022-10-07 SDOH — ECONOMIC STABILITY: FOOD INSECURITY: WITHIN THE PAST 12 MONTHS, YOU WORRIED THAT YOUR FOOD WOULD RUN OUT BEFORE YOU GOT MONEY TO BUY MORE.: NEVER TRUE

## 2022-10-07 SDOH — ECONOMIC STABILITY: HOUSING INSECURITY: IN THE LAST 12 MONTHS, HOW MANY PLACES HAVE YOU LIVED?: 1

## 2022-10-07 SDOH — HEALTH STABILITY: MENTAL HEALTH
STRESS IS WHEN SOMEONE FEELS TENSE, NERVOUS, ANXIOUS, OR CAN'T SLEEP AT NIGHT BECAUSE THEIR MIND IS TROUBLED. HOW STRESSED ARE YOU?: NOT AT ALL

## 2022-10-07 SDOH — ECONOMIC STABILITY: INCOME INSECURITY: IN THE LAST 12 MONTHS, WAS THERE A TIME WHEN YOU WERE NOT ABLE TO PAY THE MORTGAGE OR RENT ON TIME?: NO

## 2022-10-07 SDOH — ECONOMIC STABILITY: TRANSPORTATION INSECURITY
IN THE PAST 12 MONTHS, HAS LACK OF TRANSPORTATION KEPT YOU FROM MEETINGS, WORK, OR FROM GETTING THINGS NEEDED FOR DAILY LIVING?: NO

## 2022-10-07 SDOH — ECONOMIC STABILITY: TRANSPORTATION INSECURITY
IN THE PAST 12 MONTHS, HAS LACK OF RELIABLE TRANSPORTATION KEPT YOU FROM MEDICAL APPOINTMENTS, MEETINGS, WORK OR FROM GETTING THINGS NEEDED FOR DAILY LIVING?: NO

## 2022-10-07 SDOH — ECONOMIC STABILITY: TRANSPORTATION INSECURITY
IN THE PAST 12 MONTHS, HAS THE LACK OF TRANSPORTATION KEPT YOU FROM MEDICAL APPOINTMENTS OR FROM GETTING MEDICATIONS?: NO

## 2022-10-07 ASSESSMENT — LIFESTYLE VARIABLES
SKIP TO QUESTIONS 9-10: 0
AUDIT-C TOTAL SCORE: 3
HOW MANY STANDARD DRINKS CONTAINING ALCOHOL DO YOU HAVE ON A TYPICAL DAY: 3 OR 4
HOW OFTEN DO YOU HAVE SIX OR MORE DRINKS ON ONE OCCASION: NEVER
HOW OFTEN DO YOU HAVE A DRINK CONTAINING ALCOHOL: 2-4 TIMES A MONTH

## 2022-10-07 ASSESSMENT — SOCIAL DETERMINANTS OF HEALTH (SDOH)
IN A TYPICAL WEEK, HOW MANY TIMES DO YOU TALK ON THE PHONE WITH FAMILY, FRIENDS, OR NEIGHBORS?: TWICE A WEEK
HOW OFTEN DO YOU ATTENT MEETINGS OF THE CLUB OR ORGANIZATION YOU BELONG TO?: NEVER
HOW OFTEN DO YOU ATTENT MEETINGS OF THE CLUB OR ORGANIZATION YOU BELONG TO?: NEVER
HOW OFTEN DO YOU ATTEND CHURCH OR RELIGIOUS SERVICES?: NEVER
WITHIN THE PAST 12 MONTHS, YOU WORRIED THAT YOUR FOOD WOULD RUN OUT BEFORE YOU GOT THE MONEY TO BUY MORE: NEVER TRUE
DO YOU BELONG TO ANY CLUBS OR ORGANIZATIONS SUCH AS CHURCH GROUPS UNIONS, FRATERNAL OR ATHLETIC GROUPS, OR SCHOOL GROUPS?: NO
HOW MANY DRINKS CONTAINING ALCOHOL DO YOU HAVE ON A TYPICAL DAY WHEN YOU ARE DRINKING: 3 OR 4
ARE YOU MARRIED, WIDOWED, DIVORCED, SEPARATED, NEVER MARRIED, OR LIVING WITH A PARTNER?: LIVING WITH PARTNER
DO YOU BELONG TO ANY CLUBS OR ORGANIZATIONS SUCH AS CHURCH GROUPS UNIONS, FRATERNAL OR ATHLETIC GROUPS, OR SCHOOL GROUPS?: NO
IN A TYPICAL WEEK, HOW MANY TIMES DO YOU TALK ON THE PHONE WITH FAMILY, FRIENDS, OR NEIGHBORS?: TWICE A WEEK
HOW OFTEN DO YOU HAVE A DRINK CONTAINING ALCOHOL: 2-4 TIMES A MONTH
HOW OFTEN DO YOU HAVE SIX OR MORE DRINKS ON ONE OCCASION: NEVER
HOW OFTEN DO YOU ATTEND CHURCH OR RELIGIOUS SERVICES?: NEVER
ARE YOU MARRIED, WIDOWED, DIVORCED, SEPARATED, NEVER MARRIED, OR LIVING WITH A PARTNER?: LIVING WITH PARTNER

## 2022-10-07 ASSESSMENT — ACTIVITIES OF DAILY LIVING (ADL): BATHING_REQUIRES_ASSISTANCE: 0

## 2022-10-07 ASSESSMENT — PATIENT HEALTH QUESTIONNAIRE - PHQ9: CLINICAL INTERPRETATION OF PHQ2 SCORE: 0

## 2022-10-07 ASSESSMENT — ENCOUNTER SYMPTOMS: GENERAL WELL-BEING: GOOD

## 2022-10-07 NOTE — PROGRESS NOTES
Chief Complaint   Patient presents with    Annual Exam       HPI:  Jose Morgan is a 77 y.o. here for Medicare Annual Wellness Visit     Patient Active Problem List    Diagnosis Date Noted    Right upper quadrant abdominal pain 10/08/2022    Post-herpetic polyneuropathy 04/27/2022    Tobacco use 04/27/2022       Current Outpatient Medications   Medication Sig Dispense Refill    lansoprazole (PREVACID) 30 MG CAPSULE DELAYED RELEASE Take 1 Capsule by mouth every day. 30 Capsule 0     No current facility-administered medications for this visit.          Current supplements as per medication list.     Allergies: Patient has no known allergies.    Current social contact/activities: still working      He  reports that he has been smoking cigarettes. He has a 17.70 pack-year smoking history. He has never used smokeless tobacco. He reports current alcohol use of about 0.6 oz per week. He reports that he does not use drugs.  Ready to quit: Not Answered  Counseling given: Not Answered  Tobacco comments: Pt is down to 2-3 cigarettes a day Started when he was 17 years       DPA/Advanced Directive:  Patient does not have an Advanced Directive.  A packet and workshop information was given on Advanced Directives.    ROS:    Gait: Uses no assistive device  Ostomy: No  Other tubes: No  Amputations: No  Chronic oxygen use: No  Last eye exam: 1 year ago  Wears hearing aids: No   : Denies any urinary leakage during the last 6 months    Screening:  Depression Screening  Little interest or pleasure in doing things?  0 - not at all  Feeling down, depressed , or hopeless? 0 - not at all  Patient Health Questionnaire Score: 0     If depressive symptoms identified deferred to follow up visit unless specifically addressed in assessment and plan.    Interpretation of PHQ-9 Total Score   Score Severity   1-4 No Depression   5-9 Mild Depression   10-14 Moderate Depression   15-19 Moderately Severe Depression   20-27 Severe  Depression    Screening for Cognitive Impairment  Three Minute Recall (daughter, heaven, mountain) 2/3    Michael clock face with all 12 numbers and set the hands to show 10 past 11.  Yes    Cognitive concerns identified deferred for follow up unless specifically addressed in assessment and plan.    Fall Risk Assessment  Has the patient had two or more falls in the last year or any fall with injury in the last year?  No    Safety Assessment  Throw rugs on floor.  No  Handrails on all stairs.  Yes  Good lighting in all hallways.  Yes  Difficulty hearing.  Yes  Patient counseled about all safety risks that were identified.    Functional Assessment ADLs  Are there any barriers preventing you from cooking for yourself or meeting nutritional needs?  No.    Are there any barriers preventing you from driving safely or obtaining transportation?  No.    Are there any barriers preventing you from using a telephone or calling for help?  No.    Are there any barriers preventing you from shopping?  No.    Are there any barriers preventing you from taking care of your own finances?  No.    Are there any barriers preventing you from managing your medications?  No.    Are there any barriers preventing you from showering, bathing or dressing yourself?  No.    Are you currently engaging in any exercise or physical activity?  Yes.     What is your perception of your health?  Good.    Advance Care Planning  Do you have an Advance Directive, Living Will, Durable Power of , or POLST? No.    Health Maintenance Summary            Postponed - IMM DTaP/Tdap/Td Vaccine (1 - Tdap) Postponed until 4/27/2023      No completion history exists for this topic.              Postponed - IMM HEP B VACCINE (1 of 3 - 3-dose series) Postponed until 10/8/2023      No completion history exists for this topic.              Postponed - COVID-19 Vaccine (3 - Booster for Pfizer series) Postponed until 10/8/2023      04/12/2021  Imm Admin: PFIZER PURPLE  CAP SARS-COV-2 VACCINATION (12+)    03/22/2021  Imm Admin: PFIZER PURPLE CAP SARS-COV-2 VACCINATION (12+)              IMM ZOSTER VACCINES (2 of 2) Next due on 11/3/2022      09/08/2022  Imm Admin: Zoster Vaccine Recombinant (RZV) (SHINGRIX)              IMM PNEUMOCOCCAL VACCINE: 65+ Years (2 - PCV) Next due on 4/27/2023 04/27/2022  Imm Admin: Pneumococcal polysaccharide vaccine (PPSV-23)              COLORECTAL CANCER SCREENING (COLOGUARD STOOL DNA - Every 3 Years) Next due on 5/9/2025 09/01/2022  OCCULT BLOOD FECES IMMUNOASSAY    05/09/2022  COLOGUARD COLON CANCER SCREENING              Annual Wellness Visit  Completed      10/07/2022  Level of Service: ANNUAL WELLNESS VISIT-INCLUDES PPPS SUBSEQUE*              IMM INFLUENZA (Series Information) Completed      10/07/2022  Imm Admin: Influenza Vaccine Adult HD    10/10/2020  Imm Admin: Influenza Vaccine Adult HD              IMM MENINGOCOCCAL ACWY VACCINE (Series Information) Aged Out      No completion history exists for this topic.                    Patient Care Team:  Leisa Browne D.N.P. as PCP - General (Nurse Practitioner Family)  Mark Blair M.D. as PCP - [facility] Paneled    Social History     Tobacco Use    Smoking status: Every Day     Packs/day: 0.30     Years: 59.00     Pack years: 17.70     Types: Cigarettes    Smokeless tobacco: Never    Tobacco comments:     Pt is down to 2-3 cigarettes a day Started when he was 17 years    Vaping Use    Vaping Use: Never used   Substance Use Topics    Alcohol use: Yes     Alcohol/week: 0.6 oz     Types: 1 Cans of beer per week     Comment: couple times every two weeks    Drug use: Never     Family History   Problem Relation Age of Onset    Stroke Mother     Heart Disease Mother     Cancer Father 77        Lung cancer     Breast Cancer Sister 65        Mastectomy    No Known Problems Maternal Grandmother     Dementia Maternal Grandfather     Cancer Paternal Grandmother         Leukemia    No Known Problems  "Paternal Grandfather     No Known Problems Sister     No Known Problems Brother     No Known Problems Brother     No Known Problems Brother     No Known Problems Brother      He  has no past medical history on file.   No past surgical history on file.    Exam:   /66   Pulse 77   Temp 36.5 °C (97.7 °F) (Temporal)   Ht 1.803 m (5' 11\")   Wt 83.9 kg (185 lb)   SpO2 97%  Body mass index is 25.8 kg/m².    Hearing good.    Dentition good  Alert, oriented in no acute distress.  Eye contact is good, speech goal directed, affect calm    Physical Exam  Constitutional:       Appearance: Normal appearance.   Cardiovascular:      Rate and Rhythm: Normal rate and regular rhythm.      Pulses: Normal pulses.      Heart sounds: Normal heart sounds.   Pulmonary:      Effort: Pulmonary effort is normal.      Breath sounds: Normal breath sounds.   Abdominal:      General: Abdomen is flat. Bowel sounds are normal. There is no distension or abdominal bruit.      Palpations: Abdomen is soft. There is no hepatomegaly or splenomegaly.      Tenderness: There is abdominal tenderness in the right upper quadrant.      Hernia: No hernia is present.       Musculoskeletal:      Right lower leg: No edema.      Left lower leg: No edema.   Skin:     General: Skin is warm and dry.   Neurological:      General: No focal deficit present.      Mental Status: He is alert and oriented to person, place, and time.   Psychiatric:         Mood and Affect: Mood normal.         Behavior: Behavior normal.         Thought Content: Thought content normal.         Judgment: Judgment normal.     Assessment and Plan. The following treatment and monitoring plan is recommended:    1. Right upper quadrant abdominal pain  RUQ pain for several months. Not worse or better with food. Appetite is same. Had dark stool some weeks ago but now normal BM. +ve occult in 9/1/2022. R/o GERD, gastritis. Discussed referral to GI for possible scope, he would like to try PPI " first  - start Prevacid 30mg QD  - lansoprazole (PREVACID) 30 MG CAPSULE DELAYED RELEASE; Take 1 Capsule by mouth every day.  Dispense: 30 Capsule; Refill: 0    2. Need for vaccination  I have placed the below orders and discussed them with an approved delegating provider.  The MA is performing the below orders under the direction of Dr Barrera.  - Influenza Vaccine, High Dose (65+ Only)    3. Tobacco use  59 pack year smoker. He is down to 2 cigs/day (at most he smoked 1 pack/day in the past). He expresses need to quit. States he tried nicotine patch before but it gave him bad headache and nightmares.  - counseled/encouraged cessation; educated on proper use of patches; recommend he use lowest dosing since he only smokes 2 cigs/day now; remove patch before bedtime to prevent overdose of nicotine which can cause headaches and disrupted sleep  - he was concerned about lung damage at earlier visit; he met criteria for lung cancer screen & I sent referral; he completed LDLCT 5/16/2022; results were reviewed with patient- scattered micronodules noted; recommendation to repeat scan in 12 months    4. Encounter for annual wellness visit (AWV) in Medicare patient  Services suggested: No services needed at this time  Health Care Screening: Age-appropriate preventive services recommended by USPTF and ACIP covered by Medicare were discussed today. Services ordered if indicated and agreed upon by the patient.  Referrals offered: Community-based lifestyle interventions to reduce health risks and promote self-management and wellness, fall prevention, nutrition, physical activity, tobacco-use cessation, weight loss, and mental health services as per orders if indicated.    Discussion today about general wellness and lifestyle habits:    Prevent falls and reduce trip hazards; Cautioned about securing or removing rugs.  Have a working fire alarm and carbon monoxide detector;   Engage in regular physical activity and social  activities     Follow-up: Return in about 4 weeks (around 11/4/2022) for abdominal pain.

## 2022-10-08 PROBLEM — R10.11 RIGHT UPPER QUADRANT ABDOMINAL PAIN: Status: ACTIVE | Noted: 2022-10-08

## 2022-10-09 NOTE — ASSESSMENT & PLAN NOTE
RUQ pain for several months. Not worse or better with food. Appetite is same. Had dark stool some weeks ago but now normal BM. +ve occult in 9/1/2022. R/o GERD, gastritis. Discussed referral to GI for possible scope, he would like to try PPI first  - start Prevacid 30mg QD

## 2022-10-29 DIAGNOSIS — R10.11 RIGHT UPPER QUADRANT ABDOMINAL PAIN: ICD-10-CM

## 2022-11-01 RX ORDER — LANSOPRAZOLE 30 MG/1
30 CAPSULE, DELAYED RELEASE ORAL DAILY
Qty: 30 CAPSULE | Refills: 0 | Status: SHIPPED | OUTPATIENT
Start: 2022-11-01 | End: 2022-11-10 | Stop reason: SDUPTHER

## 2022-11-02 ENCOUNTER — TELEPHONE (OUTPATIENT)
Dept: MEDICAL GROUP | Facility: PHYSICIAN GROUP | Age: 77
End: 2022-11-02
Payer: MEDICARE

## 2022-11-02 NOTE — TELEPHONE ENCOUNTER
Future Appointments         Provider Department Center    11/9/2022 1:40 PM (Arrive by 1:25 PM) Leisa Browne D.N.P. [facility] [place]          ESTABLISHED PATIENT PRE-VISIT PLANNING     Patient was NOT contacted to complete PVP.     Note: Patient will not be contacted if there is no indication to call.     1.  Reviewed notes from the last few office visits within the medical group: Yes, LOV 11/07/2022    2.  If any orders were placed at last visit or intended to be done for this visit (i.e. 6 mos follow-up), do we have Results/Consult Notes?           Labs - Labs were not ordered at last office visit.  Note: If patient appointment is for lab review and patient did not complete labs, check with provider if OK to reschedule patient until labs completed.         Imaging - Imaging was not ordered at last office visit.         Referrals - No referrals were ordered at last office visit.    3. Is this appointment scheduled as a Hospital Follow-Up? No    4.  Immunizations were updated in Epic using Reconcile Outside Information activity? Yes    5.  Patient is due for the following Health Maintenance Topics:   Health Maintenance Due   Topic Date Due    IMM ZOSTER VACCINES (2 of 2) 11/03/2022     6.  AHA (Pulse8) form printed for Provider? N/A

## 2022-11-10 ENCOUNTER — OFFICE VISIT (OUTPATIENT)
Dept: MEDICAL GROUP | Facility: PHYSICIAN GROUP | Age: 77
End: 2022-11-10
Payer: MEDICARE

## 2022-11-10 VITALS
RESPIRATION RATE: 16 BRPM | HEART RATE: 85 BPM | WEIGHT: 183 LBS | HEIGHT: 71 IN | DIASTOLIC BLOOD PRESSURE: 88 MMHG | SYSTOLIC BLOOD PRESSURE: 128 MMHG | BODY MASS INDEX: 25.62 KG/M2 | TEMPERATURE: 97.9 F | OXYGEN SATURATION: 97 %

## 2022-11-10 DIAGNOSIS — R10.11 RIGHT UPPER QUADRANT ABDOMINAL PAIN: ICD-10-CM

## 2022-11-10 PROCEDURE — 99213 OFFICE O/P EST LOW 20 MIN: CPT | Performed by: NURSE PRACTITIONER

## 2022-11-10 RX ORDER — LANSOPRAZOLE 30 MG/1
30 CAPSULE, DELAYED RELEASE ORAL DAILY
Qty: 90 CAPSULE | Refills: 1 | Status: SHIPPED | OUTPATIENT
Start: 2022-11-10 | End: 2023-06-16

## 2022-11-10 ASSESSMENT — ENCOUNTER SYMPTOMS
PALPITATIONS: 0
CONSTIPATION: 0
DIARRHEA: 0
NEUROLOGICAL NEGATIVE: 1
FEVER: 0
COUGH: 0
BLOOD IN STOOL: 0
CONSTITUTIONAL NEGATIVE: 1
ABDOMINAL PAIN: 1
NAUSEA: 0
SHORTNESS OF BREATH: 0
VOMITING: 0
SPUTUM PRODUCTION: 0

## 2022-11-10 NOTE — PROGRESS NOTES
"Subjective:     CC:   Chief Complaint   Patient presents with    Abdominal Pain     Getting better, Mid abdominal. 3months.        HPI:   Patient is a 77 y.o. established male patient with medical history listed below here today for evaluation and management of:    Problem   Right Upper Quadrant Abdominal Pain    He continues to have burning pain in his right upper abdomen. He had prolonged neuropathic pain on right torso as well from shingles rash. Rash has since resolved and he is no longer taking gabapentin. Had positive occult on 9/1/2022, cologuard prior in 5/2022 was NEGATIVE.   Denies constipation, bloody stools, hemoptysis, hematuria. States he had black stool some weeks ago.   ----  Prevacid 30mg QD started on 10/8/2022         Patient Active Problem List   Diagnosis    Post-herpetic polyneuropathy    Tobacco use    Right upper quadrant abdominal pain       No past medical history on file.     No past surgical history on file.     No current outpatient medications on file prior to visit.     No current facility-administered medications on file prior to visit.        Health Maintenance: Completed  - received 2nd shingles vaccine; get records    ROS:  Review of Systems   Constitutional: Negative.  Negative for fever and malaise/fatigue.   Respiratory:  Negative for cough, sputum production and shortness of breath.    Cardiovascular:  Negative for chest pain, palpitations and leg swelling.   Gastrointestinal:  Positive for abdominal pain. Negative for blood in stool, constipation, diarrhea, nausea and vomiting.   Genitourinary: Negative.    Neurological: Negative.      Objective:     Exam:  /88   Pulse 85   Temp 36.6 °C (97.9 °F)   Resp 16   Ht 1.803 m (5' 11\")   Wt 83 kg (183 lb)   SpO2 97%   BMI 25.52 kg/m²  Body mass index is 25.52 kg/m².    Physical Exam  Constitutional:       Appearance: Normal appearance.   Cardiovascular:      Rate and Rhythm: Normal rate and regular rhythm.      Pulses: " Normal pulses.      Heart sounds: Normal heart sounds.   Pulmonary:      Effort: Pulmonary effort is normal.      Breath sounds: Normal breath sounds.   Abdominal:      General: Abdomen is flat. Bowel sounds are normal. There is no distension or abdominal bruit.      Palpations: Abdomen is soft. There is no hepatomegaly or splenomegaly.      Tenderness: There is abdominal tenderness in the right upper quadrant.      Hernia: No hernia is present.       Musculoskeletal:      Right lower leg: No edema.      Left lower leg: No edema.   Skin:     General: Skin is warm and dry.   Neurological:      General: No focal deficit present.      Mental Status: He is alert and oriented to person, place, and time.     Assessment & Plan:     77 y.o. male with the following -     Problem List Items Addressed This Visit       Right upper quadrant abdominal pain     LUQ pain for several months. Not worse or better with food. Appetite is same. Had dark stool some weeks ago but now normal BM. +ve occult in 9/1/2022. R/o GERD, gastritis. Discussed referral to GI for possible scope at last visit & he wanted to try PPI first; states improvement in pain but still there just mild. We will continue prevacid & send him to GI for EGD  - continue Prevacid 30mg QD  - referral to GI for possible EGD to evaluate LUQ discomfort         Relevant Medications    lansoprazole (PREVACID) 30 MG CAPSULE DELAYED RELEASE    Other Relevant Orders    Referral to GI for Colonoscopy       Medications Prescribed Today:  1. Right upper quadrant abdominal pain  - lansoprazole (PREVACID) 30 MG CAPSULE DELAYED RELEASE; Take 1 Capsule by mouth every day.  Dispense: 90 Capsule; Refill: 1  - Referral to GI for Colonoscopy      Educated in proper administration of medication(s) ordered today including safety, possible SE, risks, benefits, rationale and alternatives to therapy.     Return in about 6 months (around 5/10/2023) for Annual Visit.    Please note that this  dictation was created using voice recognition software. I have made every reasonable attempt to correct obvious errors, but I expect that there are errors of grammar and possibly content that I did not discover before finalizing the note.

## 2022-11-11 NOTE — ASSESSMENT & PLAN NOTE
LUQ pain for several months. Not worse or better with food. Appetite is same. Had dark stool some weeks ago but now normal BM. +ve occult in 9/1/2022. R/o GERD, gastritis. Discussed referral to GI for possible scope at last visit & he wanted to try PPI first; states improvement in pain but still there just mild. We will continue prevacid & send him to GI for EGD  - continue Prevacid 30mg QD  - referral to GI for possible EGD to evaluate LUQ discomfort

## 2022-12-27 ENCOUNTER — DOCUMENTATION (OUTPATIENT)
Dept: HEALTH INFORMATION MANAGEMENT | Facility: OTHER | Age: 77
End: 2022-12-27
Payer: MEDICARE

## 2023-05-05 ENCOUNTER — TELEPHONE (OUTPATIENT)
Dept: HEALTH INFORMATION MANAGEMENT | Facility: OTHER | Age: 78
End: 2023-05-05
Payer: MEDICARE

## 2023-05-10 ENCOUNTER — OFFICE VISIT (OUTPATIENT)
Dept: MEDICAL GROUP | Facility: PHYSICIAN GROUP | Age: 78
End: 2023-05-10
Payer: MEDICARE

## 2023-05-10 VITALS
HEIGHT: 72 IN | HEART RATE: 72 BPM | WEIGHT: 194 LBS | OXYGEN SATURATION: 98 % | RESPIRATION RATE: 18 BRPM | BODY MASS INDEX: 26.28 KG/M2 | TEMPERATURE: 97.2 F | DIASTOLIC BLOOD PRESSURE: 62 MMHG | SYSTOLIC BLOOD PRESSURE: 130 MMHG

## 2023-05-10 DIAGNOSIS — Z13.228 SCREENING FOR METABOLIC DISORDER: ICD-10-CM

## 2023-05-10 DIAGNOSIS — R10.12 LEFT UPPER QUADRANT ABDOMINAL PAIN: ICD-10-CM

## 2023-05-10 PROBLEM — R10.33 PERIUMBILICAL ABDOMINAL PAIN: Status: ACTIVE | Noted: 2023-05-10

## 2023-05-10 PROBLEM — K57.30 DIVERTICULOSIS OF LARGE INTESTINE WITHOUT PERFORATION OR ABSCESS WITHOUT BLEEDING: Status: ACTIVE | Noted: 2023-01-04

## 2023-05-10 PROBLEM — K64.1 SECOND DEGREE HEMORRHOIDS: Status: ACTIVE | Noted: 2023-01-04

## 2023-05-10 PROBLEM — R19.5 OTHER FECAL ABNORMALITIES: Status: ACTIVE | Noted: 2023-05-10

## 2023-05-10 PROBLEM — K63.5 POLYP OF COLON: Status: ACTIVE | Noted: 2023-01-04

## 2023-05-10 PROCEDURE — 99214 OFFICE O/P EST MOD 30 MIN: CPT | Performed by: NURSE PRACTITIONER

## 2023-05-10 ASSESSMENT — PATIENT HEALTH QUESTIONNAIRE - PHQ9: CLINICAL INTERPRETATION OF PHQ2 SCORE: 0

## 2023-05-10 ASSESSMENT — ENCOUNTER SYMPTOMS
CONSTIPATION: 0
VOMITING: 0
BLOOD IN STOOL: 0
FEVER: 0
PALPITATIONS: 0
CONSTITUTIONAL NEGATIVE: 1
ABDOMINAL PAIN: 1
SHORTNESS OF BREATH: 0
NAUSEA: 0
DIARRHEA: 0
COUGH: 0
NEUROLOGICAL NEGATIVE: 1
SPUTUM PRODUCTION: 0

## 2023-05-10 NOTE — PROGRESS NOTES
Subjective       CC:   Chief Complaint   Patient presents with    Other     Still having stomach aches, feels lansoprazole is not working,  still having itching and pain from shingles.         HPI:   Patient is a 77 y.o. established male patient with medical history listed below here today for follow up on LUQ pain (he is having annual visit with [facility] on 5/31/2023); currently on prevacid 30mg QD; states still having pain few times each day; lasts for a few mins each time. Also still having dark stools. His FIT test was positive in 12/2022 and I sent his to see GI. He had EGD & colonoscopy with GI consultants on 1/4/2023; 3 benign polyps were removed, recall colonoscopy in 3 years; normal mucosa noted on EGD. He has not returned for follow up.    Problem   Periumbilical Abdominal Pain   Other Fecal Abnormalities   Second Degree Hemorrhoids   Polyp of Colon   Diverticulosis of Large Intestine Without Perforation Or Abscess Without Bleeding   Left Upper Quadrant Abdominal Pain    HPI from 11/10/2022 visit - He continues to have burning pain in his left upper abdomen. He had prolonged neuropathic pain on left torso as well from shingles rash. Rash has since resolved and he is no longer taking gabapentin. Had positive occult on 9/1/2022, cologuard prior in 5/2022 was NEGATIVE.   Denies constipation, bloody stools, hemoptysis, hematuria. States he has intermittent black stool some weeks ago.   ----  Prevacid 30mg QD started on 10/8/2022  Colonoscopy 1/2023- polyp x3; recall in 3 years  EGD 1/2023- normal mucosa           Patient Active Problem List   Diagnosis    Post-herpetic polyneuropathy    Tobacco use    Left upper quadrant abdominal pain    Second degree hemorrhoids    Polyp of colon    Periumbilical abdominal pain    Other fecal abnormalities    Diverticulosis of large intestine without perforation or abscess without bleeding       History reviewed. No pertinent past medical history.     History reviewed. No  "pertinent surgical history.     Current Outpatient Medications on File Prior to Visit   Medication Sig Dispense Refill    lansoprazole (PREVACID) 30 MG CAPSULE DELAYED RELEASE Take 1 Capsule by mouth every day. 90 Capsule 1     No current facility-administered medications on file prior to visit.        Health Maintenance: Completed  - declined PNA. Tdap today    ROS:  Review of Systems   Constitutional: Negative.  Negative for fever and malaise/fatigue.   Respiratory:  Negative for cough, sputum production and shortness of breath.    Cardiovascular:  Negative for chest pain, palpitations and leg swelling.   Gastrointestinal:  Positive for abdominal pain. Negative for blood in stool, constipation, diarrhea, nausea and vomiting.   Genitourinary: Negative.    Neurological: Negative.        Objective       Exam:  /62   Pulse 72   Temp 36.2 °C (97.2 °F) (Temporal)   Resp 18   Ht 1.816 m (5' 11.5\")   Wt 88 kg (194 lb)   SpO2 98%   BMI 26.68 kg/m²  Body mass index is 26.68 kg/m².    Physical Exam  Constitutional:       Appearance: Normal appearance.   Cardiovascular:      Rate and Rhythm: Normal rate and regular rhythm.      Pulses: Normal pulses.      Heart sounds: Normal heart sounds.   Pulmonary:      Effort: Pulmonary effort is normal.      Breath sounds: Normal breath sounds.   Abdominal:      General: Abdomen is flat. Bowel sounds are normal. There is no distension or abdominal bruit.      Palpations: Abdomen is soft. There is no hepatomegaly or splenomegaly.      Tenderness: There is abdominal tenderness in the right upper quadrant.      Hernia: No hernia is present.       Musculoskeletal:      Right lower leg: No edema.      Left lower leg: No edema.   Skin:     General: Skin is warm and dry.   Neurological:      General: No focal deficit present.      Mental Status: He is alert and oriented to person, place, and time.         Assessment & Plan       77 y.o. male with the following -     Problem List " Items Addressed This Visit       Left upper quadrant abdominal pain     LUQ since 2022. Not worse or better with food. Appetite is same. Continues to have dark stool;   - had +ve occult in 9/1/2022. Saw GI consultants for EGD/colonoscopy in 1/2023; 3 benign polyps were removed, recall colonoscopy in 3 years; normal mucosa noted on EGD; states he did not have follow up visit after his scope; info for GI consultants provided today for him to schedule follow up  - continue Prevacid 30mg QD for now; considered getting abd CT, he would like to wait till he sees GI   - due for annual labs, adding lipase/amylase           Relevant Orders    CBC WITHOUT DIFFERENTIAL    Comp Metabolic Panel    LIPASE    AMYLASE     Other Visit Diagnoses       Screening for metabolic disorder        Relevant Orders    CBC WITHOUT DIFFERENTIAL    Lipid Profile    TSH WITH REFLEX TO FT4    Comp Metabolic Panel          Educated in proper administration of medication(s) ordered today including safety, possible SE, risks, benefits, rationale and alternatives to therapy.     Return in about 3 months (around 8/10/2023) for abdominal pain.    Please note that this dictation was created using voice recognition software. I have made every reasonable attempt to correct obvious errors, but I expect that there are errors of grammar and possibly content that I did not discover before finalizing the note.

## 2023-05-10 NOTE — ASSESSMENT & PLAN NOTE
LUQ since 2022. Not worse or better with food. Appetite is same. Continues to have dark stool;   - had +ve occult in 9/1/2022. Saw GI consultants for EGD/colonoscopy in 1/2023; 3 benign polyps were removed, recall colonoscopy in 3 years; normal mucosa noted on EGD; states he did not have follow up visit after his scope; info for GI consultants provided today for him to schedule follow up  - continue Prevacid 30mg QD for now; considered getting abd CT, he would like to wait till he sees GI   - due for annual labs, adding lipase/amylase

## 2023-05-13 ENCOUNTER — HOSPITAL ENCOUNTER (OUTPATIENT)
Dept: LAB | Facility: MEDICAL CENTER | Age: 78
End: 2023-05-13
Attending: NURSE PRACTITIONER
Payer: MEDICARE

## 2023-05-13 DIAGNOSIS — R10.12 LEFT UPPER QUADRANT ABDOMINAL PAIN: ICD-10-CM

## 2023-05-13 DIAGNOSIS — Z13.228 SCREENING FOR METABOLIC DISORDER: ICD-10-CM

## 2023-05-13 LAB
ALBUMIN SERPL BCP-MCNC: 4.3 G/DL (ref 3.2–4.9)
ALBUMIN/GLOB SERPL: 1.5 G/DL
ALP SERPL-CCNC: 79 U/L (ref 30–99)
ALT SERPL-CCNC: 15 U/L (ref 2–50)
AMYLASE SERPL-CCNC: 54 U/L (ref 20–103)
ANION GAP SERPL CALC-SCNC: 11 MMOL/L (ref 7–16)
AST SERPL-CCNC: 18 U/L (ref 12–45)
BILIRUB SERPL-MCNC: 0.7 MG/DL (ref 0.1–1.5)
BUN SERPL-MCNC: 24 MG/DL (ref 8–22)
CALCIUM ALBUM COR SERPL-MCNC: 9 MG/DL (ref 8.5–10.5)
CALCIUM SERPL-MCNC: 9.2 MG/DL (ref 8.5–10.5)
CHLORIDE SERPL-SCNC: 104 MMOL/L (ref 96–112)
CHOLEST SERPL-MCNC: 214 MG/DL (ref 100–199)
CO2 SERPL-SCNC: 24 MMOL/L (ref 20–33)
CREAT SERPL-MCNC: 1.13 MG/DL (ref 0.5–1.4)
ERYTHROCYTE [DISTWIDTH] IN BLOOD BY AUTOMATED COUNT: 52.4 FL (ref 35.9–50)
FASTING STATUS PATIENT QL REPORTED: NORMAL
GFR SERPLBLD CREATININE-BSD FMLA CKD-EPI: 66 ML/MIN/1.73 M 2
GLOBULIN SER CALC-MCNC: 2.8 G/DL (ref 1.9–3.5)
GLUCOSE SERPL-MCNC: 114 MG/DL (ref 65–99)
HCT VFR BLD AUTO: 47.4 % (ref 42–52)
HDLC SERPL-MCNC: 42 MG/DL
HGB BLD-MCNC: 15.3 G/DL (ref 14–18)
LDLC SERPL CALC-MCNC: 150 MG/DL
LIPASE SERPL-CCNC: 31 U/L (ref 11–82)
MCH RBC QN AUTO: 29.1 PG (ref 27–33)
MCHC RBC AUTO-ENTMCNC: 32.3 G/DL (ref 33.7–35.3)
MCV RBC AUTO: 90.3 FL (ref 81.4–97.8)
PLATELET # BLD AUTO: 208 K/UL (ref 164–446)
PMV BLD AUTO: 11.7 FL (ref 9–12.9)
POTASSIUM SERPL-SCNC: 4.4 MMOL/L (ref 3.6–5.5)
PROT SERPL-MCNC: 7.1 G/DL (ref 6–8.2)
RBC # BLD AUTO: 5.25 M/UL (ref 4.7–6.1)
SODIUM SERPL-SCNC: 139 MMOL/L (ref 135–145)
TRIGL SERPL-MCNC: 111 MG/DL (ref 0–149)
TSH SERPL DL<=0.005 MIU/L-ACNC: 2.46 UIU/ML (ref 0.38–5.33)
WBC # BLD AUTO: 6.1 K/UL (ref 4.8–10.8)

## 2023-05-13 PROCEDURE — 83690 ASSAY OF LIPASE: CPT

## 2023-05-13 PROCEDURE — 84443 ASSAY THYROID STIM HORMONE: CPT

## 2023-05-13 PROCEDURE — 80061 LIPID PANEL: CPT

## 2023-05-13 PROCEDURE — 85027 COMPLETE CBC AUTOMATED: CPT

## 2023-05-13 PROCEDURE — 82150 ASSAY OF AMYLASE: CPT

## 2023-05-13 PROCEDURE — 36415 COLL VENOUS BLD VENIPUNCTURE: CPT

## 2023-05-13 PROCEDURE — 80053 COMPREHEN METABOLIC PANEL: CPT

## 2023-05-31 PROBLEM — I70.0 AORTIC CALCIFICATION (HCC): Status: ACTIVE | Noted: 2023-05-31

## 2023-05-31 PROBLEM — R03.0 ELEVATED BP WITHOUT DIAGNOSIS OF HYPERTENSION: Status: ACTIVE | Noted: 2023-05-31

## 2023-05-31 PROBLEM — E78.5 DYSLIPIDEMIA: Status: ACTIVE | Noted: 2023-05-31

## 2023-06-16 DIAGNOSIS — R10.11 RIGHT UPPER QUADRANT ABDOMINAL PAIN: ICD-10-CM

## 2023-06-16 RX ORDER — LANSOPRAZOLE 30 MG/1
30 CAPSULE, DELAYED RELEASE ORAL DAILY
Qty: 90 CAPSULE | Refills: 1 | Status: SHIPPED | OUTPATIENT
Start: 2023-06-16 | End: 2024-03-19

## 2023-09-13 ENCOUNTER — HOSPITAL ENCOUNTER (OUTPATIENT)
Dept: LAB | Facility: MEDICAL CENTER | Age: 78
End: 2023-09-13
Attending: NURSE PRACTITIONER
Payer: MEDICARE

## 2023-09-13 LAB
ALBUMIN SERPL BCP-MCNC: 4.6 G/DL (ref 3.2–4.9)
ALBUMIN/GLOB SERPL: 1.8 G/DL
ALP SERPL-CCNC: 89 U/L (ref 30–99)
ALT SERPL-CCNC: 15 U/L (ref 2–50)
ANION GAP SERPL CALC-SCNC: 12 MMOL/L (ref 7–16)
AST SERPL-CCNC: 20 U/L (ref 12–45)
BASOPHILS # BLD AUTO: 0.5 % (ref 0–1.8)
BASOPHILS # BLD: 0.04 K/UL (ref 0–0.12)
BILIRUB SERPL-MCNC: 0.6 MG/DL (ref 0.1–1.5)
BUN SERPL-MCNC: 20 MG/DL (ref 8–22)
CALCIUM ALBUM COR SERPL-MCNC: 9 MG/DL (ref 8.5–10.5)
CALCIUM SERPL-MCNC: 9.5 MG/DL (ref 8.5–10.5)
CHLORIDE SERPL-SCNC: 99 MMOL/L (ref 96–112)
CO2 SERPL-SCNC: 24 MMOL/L (ref 20–33)
CREAT SERPL-MCNC: 1.15 MG/DL (ref 0.5–1.4)
EOSINOPHIL # BLD AUTO: 0.11 K/UL (ref 0–0.51)
EOSINOPHIL NFR BLD: 1.3 % (ref 0–6.9)
ERYTHROCYTE [DISTWIDTH] IN BLOOD BY AUTOMATED COUNT: 51.4 FL (ref 35.9–50)
GFR SERPLBLD CREATININE-BSD FMLA CKD-EPI: 65 ML/MIN/1.73 M 2
GLOBULIN SER CALC-MCNC: 2.5 G/DL (ref 1.9–3.5)
GLUCOSE SERPL-MCNC: 104 MG/DL (ref 65–99)
HCT VFR BLD AUTO: 47.8 % (ref 42–52)
HGB BLD-MCNC: 15.6 G/DL (ref 14–18)
IMM GRANULOCYTES # BLD AUTO: 0.03 K/UL (ref 0–0.11)
IMM GRANULOCYTES NFR BLD AUTO: 0.3 % (ref 0–0.9)
LYMPHOCYTES # BLD AUTO: 0.94 K/UL (ref 1–4.8)
LYMPHOCYTES NFR BLD: 10.7 % (ref 22–41)
MCH RBC QN AUTO: 29.9 PG (ref 27–33)
MCHC RBC AUTO-ENTMCNC: 32.6 G/DL (ref 32.3–36.5)
MCV RBC AUTO: 91.6 FL (ref 81.4–97.8)
MONOCYTES # BLD AUTO: 0.98 K/UL (ref 0–0.85)
MONOCYTES NFR BLD AUTO: 11.2 % (ref 0–13.4)
NEUTROPHILS # BLD AUTO: 6.67 K/UL (ref 1.82–7.42)
NEUTROPHILS NFR BLD: 76 % (ref 44–72)
NRBC # BLD AUTO: 0 K/UL
NRBC BLD-RTO: 0 /100 WBC (ref 0–0.2)
PLATELET # BLD AUTO: 195 K/UL (ref 164–446)
PMV BLD AUTO: 11.8 FL (ref 9–12.9)
POTASSIUM SERPL-SCNC: 4.5 MMOL/L (ref 3.6–5.5)
PROT SERPL-MCNC: 7.1 G/DL (ref 6–8.2)
RBC # BLD AUTO: 5.22 M/UL (ref 4.7–6.1)
SODIUM SERPL-SCNC: 135 MMOL/L (ref 135–145)
WBC # BLD AUTO: 8.8 K/UL (ref 4.8–10.8)

## 2023-09-13 PROCEDURE — 36415 COLL VENOUS BLD VENIPUNCTURE: CPT

## 2023-09-13 PROCEDURE — 85025 COMPLETE CBC W/AUTO DIFF WBC: CPT

## 2023-09-13 PROCEDURE — 80053 COMPREHEN METABOLIC PANEL: CPT

## 2023-09-18 ENCOUNTER — HOSPITAL ENCOUNTER (OUTPATIENT)
Dept: RADIOLOGY | Facility: MEDICAL CENTER | Age: 78
End: 2023-09-18
Attending: NURSE PRACTITIONER
Payer: MEDICARE

## 2023-09-18 DIAGNOSIS — R10.84 GENERALIZED ABDOMINAL PAIN: ICD-10-CM

## 2023-09-18 PROCEDURE — 74177 CT ABD & PELVIS W/CONTRAST: CPT

## 2023-09-18 PROCEDURE — 700117 HCHG RX CONTRAST REV CODE 255: Performed by: NURSE PRACTITIONER

## 2023-09-18 RX ADMIN — IOHEXOL 25 ML: 240 INJECTION, SOLUTION INTRATHECAL; INTRAVASCULAR; INTRAVENOUS; ORAL at 14:15

## 2023-09-18 RX ADMIN — IOHEXOL 100 ML: 350 INJECTION, SOLUTION INTRAVENOUS at 13:45

## 2024-03-19 ENCOUNTER — OFFICE VISIT (OUTPATIENT)
Dept: FAMILY PLANNING/WOMEN'S HEALTH CLINIC | Facility: PHYSICIAN GROUP | Age: 79
End: 2024-03-19
Attending: FAMILY MEDICINE
Payer: MEDICARE

## 2024-03-19 VITALS
SYSTOLIC BLOOD PRESSURE: 138 MMHG | HEIGHT: 71 IN | BODY MASS INDEX: 26.6 KG/M2 | DIASTOLIC BLOOD PRESSURE: 80 MMHG | WEIGHT: 190 LBS

## 2024-03-19 DIAGNOSIS — Z72.0 TOBACCO USE: ICD-10-CM

## 2024-03-19 DIAGNOSIS — B02.23 POST-HERPETIC POLYNEUROPATHY: ICD-10-CM

## 2024-03-19 DIAGNOSIS — R10.33 PERIUMBILICAL ABDOMINAL PAIN: ICD-10-CM

## 2024-03-19 DIAGNOSIS — I70.0 AORTIC CALCIFICATION (HCC): ICD-10-CM

## 2024-03-19 PROCEDURE — 3079F DIAST BP 80-89 MM HG: CPT

## 2024-03-19 PROCEDURE — 3075F SYST BP GE 130 - 139MM HG: CPT

## 2024-03-19 PROCEDURE — 1125F AMNT PAIN NOTED PAIN PRSNT: CPT

## 2024-03-19 PROCEDURE — G0439 PPPS, SUBSEQ VISIT: HCPCS

## 2024-03-19 SDOH — ECONOMIC STABILITY: FOOD INSECURITY: WITHIN THE PAST 12 MONTHS, THE FOOD YOU BOUGHT JUST DIDN'T LAST AND YOU DIDN'T HAVE MONEY TO GET MORE.: NEVER TRUE

## 2024-03-19 SDOH — ECONOMIC STABILITY: FOOD INSECURITY: WITHIN THE PAST 12 MONTHS, YOU WORRIED THAT YOUR FOOD WOULD RUN OUT BEFORE YOU GOT MONEY TO BUY MORE.: NEVER TRUE

## 2024-03-19 SDOH — ECONOMIC STABILITY: INCOME INSECURITY: HOW HARD IS IT FOR YOU TO PAY FOR THE VERY BASICS LIKE FOOD, HOUSING, MEDICAL CARE, AND HEATING?: NOT HARD AT ALL

## 2024-03-19 SDOH — ECONOMIC STABILITY: INCOME INSECURITY: IN THE LAST 12 MONTHS, WAS THERE A TIME WHEN YOU WERE NOT ABLE TO PAY THE MORTGAGE OR RENT ON TIME?: NO

## 2024-03-19 SDOH — ECONOMIC STABILITY: HOUSING INSECURITY: IN THE LAST 12 MONTHS, HOW MANY PLACES HAVE YOU LIVED?: 1

## 2024-03-19 ASSESSMENT — ENCOUNTER SYMPTOMS: GENERAL WELL-BEING: GOOD

## 2024-03-19 ASSESSMENT — FIBROSIS 4 INDEX: FIB4 SCORE: 2.09

## 2024-03-19 ASSESSMENT — PAIN SCALES - GENERAL: PAINLEVEL: 6=MODERATE PAIN

## 2024-03-19 ASSESSMENT — ACTIVITIES OF DAILY LIVING (ADL): BATHING_REQUIRES_ASSISTANCE: 0

## 2024-03-19 ASSESSMENT — PATIENT HEALTH QUESTIONNAIRE - PHQ9: CLINICAL INTERPRETATION OF PHQ2 SCORE: 0

## 2024-03-19 NOTE — ASSESSMENT & PLAN NOTE
Chronic, stable. Noted on CT Lung on 5/2022. No current treatment.  Follow-up at least annually.

## 2024-03-19 NOTE — ASSESSMENT & PLAN NOTE
Chronic, stable. The patient had shingles in December of 2021. The patient reports that he still gets itching and stinging to his left torso.  No current treatment. Follow-up at least annually.

## 2024-03-19 NOTE — ASSESSMENT & PLAN NOTE
Chronic, stable. The patient reports that he cut down to 2-3 cigarettes per day. The patient is not ready to quit at this time. Counseling provided.  Follow-up at least annually.

## 2024-03-19 NOTE — PROGRESS NOTES
Comprehensive Health Assessment Program     Jose Morgan is a 78 y.o. here for his comprehensive health assessment.    Patient Active Problem List    Diagnosis Date Noted    Dyslipidemia 05/31/2023    Aortic calcification (HCC) 05/31/2023    Elevated BP without diagnosis of hypertension 05/31/2023    BMI 26.0-26.9,adult 05/31/2023    Periumbilical abdominal pain 05/10/2023    Other fecal abnormalities 05/10/2023    Second degree hemorrhoids 01/04/2023    Polyp of colon 01/04/2023    Diverticulosis of large intestine without perforation or abscess without bleeding 01/04/2023    Left upper quadrant abdominal pain 10/08/2022    Post-herpetic polyneuropathy 04/27/2022    Tobacco use 04/27/2022       Current Outpatient Medications   Medication Sig Dispense Refill    melatonin 3 MG Tab Take 3 mg by mouth at bedtime.      acetaminophen (TYLENOL) 325 MG Tab Take 325 mg by mouth.       No current facility-administered medications for this visit.          Current supplements as per medication list.     Allergies:   Patient has no known allergies.  Social History     Tobacco Use    Smoking status: Every Day     Current packs/day: 0.50     Average packs/day: 0.5 packs/day for 62.2 years (31.1 ttl pk-yrs)     Types: Cigarettes     Start date: 1962    Smokeless tobacco: Never    Tobacco comments:     Cigarettes: 1/2 ppd 1962-11/2022;  5-6 cigarettes/day x11/2022   Vaping Use    Vaping Use: Never used   Substance Use Topics    Alcohol use: Yes     Alcohol/week: 0.6 oz     Types: 1 Cans of beer per week     Comment: couple times every two weeks    Drug use: Never     Family History   Problem Relation Age of Onset    Stroke Mother     Heart Disease Mother     Cancer Father 77        Lung cancer     Breast Cancer Sister 65        Mastectomy    No Known Problems Maternal Grandmother     Dementia Maternal Grandfather     Cancer Paternal Grandmother         Leukemia    No Known Problems Paternal Grandfather     No Known Problems  Sister     No Known Problems Brother     No Known Problems Brother     No Known Problems Brother     No Known Problems Brother      Gene  has no past medical history on file.   History reviewed. No pertinent surgical history.    Screening:  In the last six months have you experienced any leakage of urine? No    Depression Screening  Little interest or pleasure in doing things?  0 - not at all  Feeling down, depressed , or hopeless? 0 - not at all  Patient Health Questionnaire Score: 0     If depressive symptoms identified deferred to follow up visit unless specifically addressed in assessment and plan.    Interpretation of PHQ-9 Total Score   Score Severity   1-4 No Depression   5-9 Mild Depression   10-14 Moderate Depression   15-19 Moderately Severe Depression   20-27 Severe Depression    Screening for Cognitive Impairment  Do you or any of your friends or family members have any concern about your memory? No  Three Minute Recall (Leader, Season, Table) 3/3    Michael clock face with all 12 numbers and set the hands to show 10 minutes after 11.  Yes 5/5  Cognitive concerns identified deferred for follow up unless specifically addressed in assessment and plan.    Fall Risk Assessment  Has the patient had two or more falls in the last year or any fall with injury in the last year?  No    Safety Assessment  Do you always wear your seatbelt?  Yes  Any changes to home needed to function safely? No  Difficulty hearing.  Yes  Patient counseled about all safety risks that were identified.    Functional Assessment ADLs  Are there any barriers preventing you from cooking for yourself or meeting nutritional needs?  No.    Are there any barriers preventing you from driving safely or obtaining transportation?  No.    Are there any barriers preventing you from using a telephone or calling for help?  No    Are there any barriers preventing you from shopping?  No.    Are there any barriers preventing you from taking care of your own  finances?  No    Are there any barriers preventing you from managing your medications?  No    Are there any barriers preventing you from showering, bathing or dressing yourself? No    Are there any barriers preventing you from doing housework or laundry? No  Are there any barriers preventing you from using the toilet?No  Are you currently engaging in any exercise or physical activity?  Yes.      Self-Assessment of Health  What is your perception of your health? Good  Do you sleep more than six hours a night? No  In the past 7 days, how much did pain keep you from doing your normal work? None  Do you spend quality time with family or friends (virtually or in person)? Yes  Do you usually eat a heart healthy diet that constists of a variety of fruits, vegetables, whole grains and fiber? Yes  Do you eat foods high in fat and/or Fast Food more than three times per week? No    Advance Care Planning  Do you have an Advance Directive, Living Will, Durable Power of , or POLST? No                 Health Maintenance Summary            Overdue - Hepatitis C Screening (Once) Never done      No completion history exists for this topic.              Overdue - Influenza Vaccine (1) Overdue since 9/1/2023 11/19/2022  Imm Admin: Influenza Seasonal Injectable - Historical Data    10/07/2022  Imm Admin: Influenza Vaccine Adult HD    10/10/2020  Imm Admin: Influenza Vaccine Adult HD              Overdue - COVID-19 Vaccine (3 - 2023-24 season) Overdue since 9/1/2023 04/12/2021  Imm Admin: PFIZER PURPLE CAP SARS-COV-2 VACCINATION (12+)    03/22/2021  Imm Admin: PFIZER PURPLE CAP SARS-COV-2 VACCINATION (12+)              Postponed - IMM DTaP/Tdap/Td Vaccine (1 - Tdap) Postponed until 5/10/2024      No completion history exists for this topic.              Postponed - Pneumococcal Vaccine: 65+ Years (2 of 2 - PCV) Postponed until 5/10/2024      04/27/2022  Imm Admin: Pneumococcal polysaccharide vaccine (PPSV-23)               Annual Wellness Visit (Yearly) Next due on 3/19/2025      03/19/2024  Level of Service: IA ANNUAL WELLNESS VISIT-INCLUDES PPPS SUBSEQUE*    05/31/2023  Level of Service: IA ANNUAL WELLNESS VISIT-INCLUDES PPPS SUBSEQUE*    10/07/2022  Level of Service: IA ANNUAL WELLNESS VISIT-INCLUDES PPPS SUBSEQUE*              Lung Cancer Screening Shared Decision Making  Completed      05/05/2022  Level of Service: IA COUNSELING LUNG CA SCREEN LDCT              Zoster (Shingles) Vaccines (Series Information) Completed      11/09/2022  Imm Admin: Zoster Vaccine Recombinant (RZV) (SHINGRIX)    09/08/2022  Imm Admin: Zoster Vaccine Recombinant (RZV) (SHINGRIX)              Hepatitis A Vaccine (Hep A) (Series Information) Aged Out      No completion history exists for this topic.              Hepatitis B Vaccine (Hep B) (Series Information) Aged Out      No completion history exists for this topic.              HPV Vaccines (Series Information) Aged Out      No completion history exists for this topic.              Polio Vaccine (Inactivated Polio) (Series Information) Aged Out      No completion history exists for this topic.              Meningococcal Immunization (Series Information) Aged Out      No completion history exists for this topic.              Discontinued - Colorectal Cancer Screening  Discontinued        Frequency changed to Never automatically (Topic No Longer Applies)    01/04/2023  AMB EXTERNAL COLONOSCOPY RESULTS    09/01/2022  OCCULT BLOOD FECES IMMUNOASSAY    05/09/2022  COLOGUARD COLON CANCER SCREENING              Discontinued - Lung Cancer Screening  Discontinued        Frequency changed to Never automatically (Topic No Longer Applies)    05/16/2022  CT-LUNG CANCER-SCREENING                    Patient Care Team:  KAITLYNN GarciaP. as PCP - General (Nurse Practitioner Family)      Financial Resource Strain: Low Risk  (3/19/2024)    Overall Financial Resource Strain (CARDIA)     Difficulty of Paying  "Living Expenses: Not hard at all      Transportation Needs: No Transportation Needs (3/19/2024)    PRAPARE - Transportation     Lack of Transportation (Medical): No     Lack of Transportation (Non-Medical): No      Food Insecurity: No Food Insecurity (3/19/2024)    Hunger Vital Sign     Worried About Running Out of Food in the Last Year: Never true     Ran Out of Food in the Last Year: Never true        Encounter Vitals  Blood Pressure : 138/80  O2 Delivery Device: None - Room Air  Weight: 86.2 kg (190 lb)  Height: 180.3 cm (5' 11\")  BMI (Calculated): 26.5  Pain Score: 6=Moderate Pain  DME  O2 Delivery Device: None - Room Air     Alert, oriented in no acute distress.  Eye contact is good, speech goal directed, affect calm.    Assessment and Plan. The following treatment and monitoring plan is recommended:  Tobacco use  Chronic, stable. The patient reports that he cut down to 2-3 cigarettes per day. The patient is not ready to quit at this time. Counseling provided.  Follow-up at least annually.      Aortic calcification (HCC)  Chronic, stable. Noted on CT Lung on 5/2022. No current treatment.  Follow-up at least annually.      Periumbilical abdominal pain  Chronic, stable. The patient reports that he continues to have abdominal pain. He has tried prevacid but it was not helpful. He is being followed gastroenterology.  Follow-up at least annually.      BMI 26.0-26.9,adult  Chronic, stable. The patient reports that he walks all day at work. He walks to grocery store.  He avoids fast food.  Discussed eating a diet that contains many vegetables, fruits, and whole grains; includes low-fat dairy products, poultry, fish, beans, non-tropical vegetable oils and nuts; and limit intake of sweets, sugar-sweetened drinks and red  meats.  Follow-up at least annually.      Post-herpetic polyneuropathy  Chronic, stable. The patient had shingles in December of 2021. The patient reports that he still gets itching and stinging to his " left torso.  No current treatment. Follow-up at least annually.      Services suggested: No services needed at this time  Health Care Screening: Age-appropriate preventive services recommended by USPTF and ACIP covered by Medicare were discussed today. Services ordered if indicated and agreed upon by the patient.  Referrals offered: Community-based lifestyle interventions to reduce health risks and promote self-management and wellness, fall prevention, nutrition, physical activity, tobacco-use cessation, weight loss, and mental health services as per orders if indicated.    Discussion today about general wellness and lifestyle habits:    Prevent falls and reduce trip hazards; Cautioned about securing or removing rugs.  Have a working fire alarm and carbon monoxide detector.  Engage in regular physical activity and social activities.    Follow-up: Return for appointment with Primary Care Provider as needed.

## 2024-03-19 NOTE — ASSESSMENT & PLAN NOTE
Chronic, stable. The patient reports that he continues to have abdominal pain. He has tried prevacid but it was not helpful. He is being followed gastroenterology.  Follow-up at least annually.

## 2024-03-19 NOTE — ASSESSMENT & PLAN NOTE
Chronic, stable. The patient reports that he walks all day at work. He walks to grocery store.  He avoids fast food.  Discussed eating a diet that contains many vegetables, fruits, and whole grains; includes low-fat dairy products, poultry, fish, beans, non-tropical vegetable oils and nuts; and limit intake of sweets, sugar-sweetened drinks and red  meats.  Follow-up at least annually.

## 2024-05-15 ENCOUNTER — OFFICE VISIT (OUTPATIENT)
Dept: MEDICAL GROUP | Facility: PHYSICIAN GROUP | Age: 79
End: 2024-05-15
Payer: MEDICARE

## 2024-05-15 VITALS
BODY MASS INDEX: 26.6 KG/M2 | HEART RATE: 70 BPM | SYSTOLIC BLOOD PRESSURE: 128 MMHG | WEIGHT: 190 LBS | OXYGEN SATURATION: 97 % | RESPIRATION RATE: 16 BRPM | TEMPERATURE: 97.5 F | HEIGHT: 71 IN | DIASTOLIC BLOOD PRESSURE: 84 MMHG

## 2024-05-15 DIAGNOSIS — Z12.5 PROSTATE CANCER SCREENING: ICD-10-CM

## 2024-05-15 DIAGNOSIS — Z11.59 NEED FOR HEPATITIS C SCREENING TEST: ICD-10-CM

## 2024-05-15 DIAGNOSIS — E78.5 DYSLIPIDEMIA: ICD-10-CM

## 2024-05-15 DIAGNOSIS — Z02.89 ENCOUNTER FOR COMPLETION OF FORM WITH PATIENT: ICD-10-CM

## 2024-05-15 PROCEDURE — 7101 PR PHYSICAL: Performed by: NURSE PRACTITIONER

## 2024-05-15 ASSESSMENT — ENCOUNTER SYMPTOMS
ABDOMINAL PAIN: 1
BLOOD IN STOOL: 0
VOMITING: 0
NEUROLOGICAL NEGATIVE: 1
NAUSEA: 0
FEVER: 0
CONSTIPATION: 0
DIARRHEA: 0
PALPITATIONS: 0
CONSTITUTIONAL NEGATIVE: 1
SHORTNESS OF BREATH: 0
COUGH: 0
SPUTUM PRODUCTION: 0

## 2024-05-15 ASSESSMENT — FIBROSIS 4 INDEX: FIB4 SCORE: 2.09

## 2024-05-15 NOTE — PROGRESS NOTES
Subjective       CC:   Chief Complaint   Patient presents with    Paperwork     DMV        HPI:   Patient is a 78 y.o. established male patient with medical history listed below here today with paperwork for DMV license renewal. He had eye exam done with his ophthalmologist on 5/10/2024.     He is due for annual labs so we will get these ordered today. No other concerns.     He saw GI in 9/2023 for his LUQ abdominal pain. CT was done and showed Colonic diverticulosis. Recommended to increase dietary fiber. He has follow up in 3 weeks. Still has mild pain in abdomen.       Patient Active Problem List   Diagnosis    Post-herpetic polyneuropathy    Tobacco use    Left upper quadrant abdominal pain    Second degree hemorrhoids    Polyp of colon    Periumbilical abdominal pain    Other fecal abnormalities    Diverticulosis of large intestine without perforation or abscess without bleeding    Dyslipidemia    Aortic calcification (HCC)    Elevated BP without diagnosis of hypertension    BMI 26.0-26.9,adult       History reviewed. No pertinent past medical history.     History reviewed. No pertinent surgical history.     Current Outpatient Medications on File Prior to Visit   Medication Sig Dispense Refill    melatonin 3 MG Tab Take 3 mg by mouth at bedtime.      acetaminophen (TYLENOL) 325 MG Tab Take 325 mg by mouth.       No current facility-administered medications on file prior to visit.        Health Maintenance: Completed    ROS:  Review of Systems   Constitutional: Negative.  Negative for fever and malaise/fatigue.   Respiratory:  Negative for cough, sputum production and shortness of breath.    Cardiovascular:  Negative for chest pain, palpitations and leg swelling.   Gastrointestinal:  Positive for abdominal pain. Negative for blood in stool, constipation, diarrhea, nausea and vomiting.   Genitourinary: Negative.    Neurological: Negative.        Objective       Exam:  /84 (BP Location: Left arm, Patient  "Position: Sitting, BP Cuff Size: Adult)   Pulse 70   Temp 36.4 °C (97.5 °F) (Temporal)   Resp 16   Ht 1.803 m (5' 11\")   Wt 86.2 kg (190 lb)   SpO2 97%   BMI 26.50 kg/m²  Body mass index is 26.5 kg/m².    Physical Exam  Constitutional:       Appearance: Normal appearance.   Cardiovascular:      Rate and Rhythm: Normal rate and regular rhythm.      Pulses: Normal pulses.      Heart sounds: Normal heart sounds.   Pulmonary:      Effort: Pulmonary effort is normal.      Breath sounds: Normal breath sounds.   Abdominal:      General: Abdomen is flat. Bowel sounds are normal. There is no distension or abdominal bruit.      Palpations: Abdomen is soft. There is no hepatomegaly or splenomegaly.      Tenderness: There is abdominal tenderness in the right upper quadrant.      Hernia: No hernia is present.       Musculoskeletal:      Right lower leg: No edema.      Left lower leg: No edema.   Skin:     General: Skin is warm and dry.   Neurological:      General: No focal deficit present.      Mental Status: He is alert and oriented to person, place, and time.         Assessment & Plan       78 y.o. male with the following -   1. Prostate cancer screening  PROSTATE SPECIFIC AG SCREENING      2. Dyslipidemia  Lipid Profile      3. Need for hepatitis C screening test  HEP C VIRUS ANTIBODY      4. Encounter for completion of form with patient              Educated in proper administration of medication(s) ordered today including safety, possible SE, risks, benefits, rationale and alternatives to therapy.     Return in about 6 months (around 11/15/2024) for establish care .    Please note that this dictation was created using voice recognition software. I have made every reasonable attempt to correct obvious errors, but I expect that there are errors of grammar and possibly content that I did not discover before finalizing the note.        "

## 2024-05-21 ENCOUNTER — DOCUMENTATION (OUTPATIENT)
Dept: HEALTH INFORMATION MANAGEMENT | Facility: OTHER | Age: 79
End: 2024-05-21
Payer: MEDICARE

## 2024-12-05 ENCOUNTER — OFFICE VISIT (OUTPATIENT)
Dept: MEDICAL GROUP | Facility: PHYSICIAN GROUP | Age: 79
End: 2024-12-05
Payer: MEDICARE

## 2024-12-05 VITALS
SYSTOLIC BLOOD PRESSURE: 120 MMHG | HEART RATE: 80 BPM | BODY MASS INDEX: 25.98 KG/M2 | WEIGHT: 196 LBS | HEIGHT: 73 IN | DIASTOLIC BLOOD PRESSURE: 70 MMHG | TEMPERATURE: 97 F | OXYGEN SATURATION: 97 %

## 2024-12-05 DIAGNOSIS — B02.23 POST-HERPETIC POLYNEUROPATHY: ICD-10-CM

## 2024-12-05 DIAGNOSIS — R91.8 PULMONARY NODULES/LESIONS, MULTIPLE: ICD-10-CM

## 2024-12-05 DIAGNOSIS — Z12.5 ENCOUNTER FOR SCREENING FOR MALIGNANT NEOPLASM OF PROSTATE: ICD-10-CM

## 2024-12-05 DIAGNOSIS — R53.83 FATIGUE, UNSPECIFIED TYPE: ICD-10-CM

## 2024-12-05 DIAGNOSIS — I70.0 AORTIC CALCIFICATION (HCC): ICD-10-CM

## 2024-12-05 DIAGNOSIS — R05.3 CHRONIC COUGH: ICD-10-CM

## 2024-12-05 DIAGNOSIS — Z72.0 TOBACCO USE: ICD-10-CM

## 2024-12-05 DIAGNOSIS — E78.5 DYSLIPIDEMIA: ICD-10-CM

## 2024-12-05 PROBLEM — R10.12 LEFT UPPER QUADRANT ABDOMINAL PAIN: Status: RESOLVED | Noted: 2022-10-08 | Resolved: 2024-12-05

## 2024-12-05 PROBLEM — B02.29 POST HERPETIC NEURALGIA: Status: ACTIVE | Noted: 2024-12-05

## 2024-12-05 PROBLEM — R10.33 PERIUMBILICAL ABDOMINAL PAIN: Status: RESOLVED | Noted: 2023-05-10 | Resolved: 2024-12-05

## 2024-12-05 PROCEDURE — 99214 OFFICE O/P EST MOD 30 MIN: CPT

## 2024-12-05 PROCEDURE — 3078F DIAST BP <80 MM HG: CPT

## 2024-12-05 PROCEDURE — 3074F SYST BP LT 130 MM HG: CPT

## 2024-12-05 RX ORDER — GABAPENTIN 300 MG/1
300 CAPSULE ORAL 3 TIMES DAILY
COMMUNITY

## 2024-12-05 ASSESSMENT — ENCOUNTER SYMPTOMS
DIARRHEA: 0
COUGH: 1
INSOMNIA: 0
HEARTBURN: 0
WEIGHT LOSS: 0
DEPRESSION: 0
CONSTIPATION: 0
BACK PAIN: 0
HEADACHES: 0

## 2024-12-05 ASSESSMENT — FIBROSIS 4 INDEX: FIB4 SCORE: 2.12

## 2024-12-05 NOTE — ASSESSMENT & PLAN NOTE
-See above plan for tobacco use  Orders:    CT-CHEST (THORAX) W/O; Future    PROSTATE SPECIFIC AG SCREENING; Future

## 2024-12-05 NOTE — PROGRESS NOTES
Subjective:     CC:  Diagnoses of Post-herpetic polyneuropathy, Tobacco use, Pulmonary nodules/lesions, multiple, Chronic cough, Dyslipidemia, Aortic calcification (HCC), Fatigue, unspecified type, and Encounter for screening for malignant neoplasm of prostate were pertinent to this visit.    HISTORY OF THE PRESENT ILLNESS: Patient is a 79 y.o. male. This pleasant patient is here today to establish care and discuss medication refills.     Problem   Pulmonary Nodules/Lesions, Multiple    Seen on lung cancer screening CT 5/2022     Dyslipidemia   Aortic Calcification (Hcc)   Post-Herpetic Polyneuropathy    Initial shingles rash across left lower torso in 12/2021; he went to  and stated did not receive any treatment at the time. Reported burning pain was still present anterior part of torso & he was started on 7 day valtrex with prn gabapentin on 4/27/2022. Rash improved on 5/17/2022 with residual burning and intermittent itching, valtrex was extended with gabapentin; he continued to have left side pain around area where rash was and valtrex & gabapentin dosing was increased on 6/28/2022.    Getting gabapentin from gastroenterologist for chronic stomach aches related to nerve damage occurring during his shingles      Tobacco Use    59 pack year smoker. He is down to 2 cigs/day (at most he smoked 1 pack/day in the past). He expresses need to quit. States he tried nicotine patch before but it gave him bad headache and nightmares.  - counseled/encouraged cessation; educated on proper use of patches; recommend he use lowest dosing since he only smokes 2 cigs/day now; remove patch before bedtime to prevent overdose of nicotine which can cause headaches and disrupted sleep  - he was concerned about lung damage at our last visit; he met criteria for lung cancer screen & I sent referral; he completed LDLCT yesterday; results reviewed today with patient- scattered micronodules noted; recommendation to repeat scan in 12  "months      5/16/2022  FINDINGS:  Scattered pulmonary micronodules. Central airways are patent and there is no bronchiectasis. No pleural effusion. No consolidation. Thyroid is unremarkable. There is no mediastinal or hilar adenopathy. No axillary adenopathy. Cardiac chambers are normal   in size. No pericardial effusion. There are coronary artery calcifications. There are aortic vascular calcifications. No aneurysm. There is no upper abdominal adenopathy. No suspicious osseous abnormality.     IMPRESSION:   Scattered pulmonary micronodules.     Lung RADS: 2 - Benign Appearance or Behavior  Nodules with a very low likelihood of becoming a clinically active cancer due to size or lack of growth     Findings: solid nodule(s): less than 6 mm or new less than 4 mm  part solid nodule(s): less than 6 mm total diameter on baseline screening  non solid nodule(s) (GGN): less than 30 mm OR greater than or equal to 30 mm and unchanged or slowly growing  category 3 or 4 nodules unchanged for greater than or equal to 3 months  perifissural nodule(s) less than 10 mm     Management: Continue annual screening with LDCT in 12 months         Health Maintenance: Completed    ROS:   Review of Systems   Constitutional:  Negative for weight loss.   Respiratory:  Positive for cough.    Cardiovascular:  Negative for leg swelling.   Gastrointestinal:  Negative for constipation, diarrhea and heartburn.   Genitourinary:  Negative for dysuria, frequency and urgency.   Musculoskeletal:  Negative for back pain.   Neurological:  Negative for headaches.   Psychiatric/Behavioral:  Negative for depression. The patient does not have insomnia.          Objective:       Exam: /70 (BP Location: Left arm, Patient Position: Sitting, BP Cuff Size: Adult)   Pulse 80   Temp 36.1 °C (97 °F) (Temporal)   Ht 1.854 m (6' 1\")   Wt 88.9 kg (196 lb)   SpO2 97%  Body mass index is 25.86 kg/m².    Physical Exam  Constitutional:       Appearance: Normal " appearance.   HENT:      Right Ear: Tympanic membrane and ear canal normal.      Left Ear: Tympanic membrane and ear canal normal.      Mouth/Throat:      Mouth: Mucous membranes are moist.      Pharynx: Oropharynx is clear.   Eyes:      Conjunctiva/sclera: Conjunctivae normal.      Pupils: Pupils are equal, round, and reactive to light.   Cardiovascular:      Rate and Rhythm: Normal rate and regular rhythm.   Pulmonary:      Effort: Pulmonary effort is normal. No respiratory distress.      Breath sounds: No stridor. Wheezing present. No rhonchi or rales.   Abdominal:      General: Abdomen is flat. There is no distension.      Palpations: Abdomen is soft.      Tenderness: There is no abdominal tenderness.   Musculoskeletal:         General: No swelling.      Cervical back: Normal range of motion.      Right lower leg: No edema.      Left lower leg: No edema.   Skin:     General: Skin is warm and dry.      Capillary Refill: Capillary refill takes less than 2 seconds.      Findings: No rash.   Neurological:      General: No focal deficit present.      Mental Status: He is alert and oriented to person, place, and time.      Deep Tendon Reflexes: Reflexes normal.   Psychiatric:         Mood and Affect: Mood normal.             Assessment & Plan:   79 y.o. male with the following -    Assessment & Plan  Post-herpetic polyneuropathy  -Chronic, stable  -Patient has residual stomach pains and symptoms related to shingles he experienced some years ago, was placed on gabapentin by his GI which has worked tremendously well for him.  -Continue gabapentin 300 mg 3 times daily  -Refills for gabapentin sent today       Tobacco use  -Chronic, ongoing  -Patient counseled on significant health risks related to continued smoking.  -Patient offered assistance in smoking cessation today as well as other resources  -Patient is due for CT imaging related to pulmonary nodules previously seen on lung cancer screening exam  Orders:     CT-CHEST (THORAX) W/O; Future    Pulmonary nodules/lesions, multiple  -See above plan for tobacco use  Orders:    CT-CHEST (THORAX) W/O; Future    PROSTATE SPECIFIC AG SCREENING; Future    Chronic cough  -Chronic, stable  -Likely related to patient's long history of smoking we will repeat lung imaging  Orders:    CT-CHEST (THORAX) W/O; Future    Dyslipidemia  -Chronic, stable not on medication  -Discussed healthier lifestyle and dietary changes including incorporating more fruits with higher concentrations of HDL including avocado, tree nuts, and fish.  -Relevant laboratory work ordered today  Orders:    CBC WITH DIFFERENTIAL; Future    Comp Metabolic Panel; Future    Lipid Profile; Future    Aortic calcification (HCC)  -Chronic, stable  -Seen on imaging several years ago, patient is asymptomatic  -Relevant laboratory work ordered today  Orders:    CBC WITH DIFFERENTIAL; Future    Comp Metabolic Panel; Future    Lipid Profile; Future    Fatigue, unspecified type  -Chronic, ongoing  -Relevant laboratory work ordered today to identify organic causes  Orders:    CBC WITH DIFFERENTIAL; Future    Comp Metabolic Panel; Future    TSH WITH REFLEX TO FT4; Future    VITAMIN D,25 HYDROXY (DEFICIENCY); Future    Encounter for screening for malignant neoplasm of prostate  - Patient with strong family history of prostate cancer  - desiring PSA which have ordered today  Orders:    PROSTATE SPECIFIC AG SCREENING; Future        Return in about 6 months (around 6/5/2025) for f/u.    Please note that this dictation was created using voice recognition software. I have made every reasonable attempt to correct obvious errors, but I expect that there are errors of grammar and possibly content that I did not discover before finalizing the note.        Mino Chavez D.O.

## 2024-12-05 NOTE — ASSESSMENT & PLAN NOTE
-Chronic, stable not on medication  -Discussed healthier lifestyle and dietary changes including incorporating more fruits with higher concentrations of HDL including avocado, tree nuts, and fish.  -Relevant laboratory work ordered today  Orders:    CBC WITH DIFFERENTIAL; Future    Comp Metabolic Panel; Future    Lipid Profile; Future

## 2024-12-05 NOTE — ASSESSMENT & PLAN NOTE
-Chronic, stable  -Seen on imaging several years ago, patient is asymptomatic  -Relevant laboratory work ordered today  Orders:    CBC WITH DIFFERENTIAL; Future    Comp Metabolic Panel; Future    Lipid Profile; Future

## 2024-12-05 NOTE — ASSESSMENT & PLAN NOTE
-Chronic, ongoing  -Patient counseled on significant health risks related to continued smoking.  -Patient offered assistance in smoking cessation today as well as other resources  -Patient is due for CT imaging related to pulmonary nodules previously seen on lung cancer screening exam  Orders:    CT-CHEST (THORAX) W/O; Future

## 2024-12-05 NOTE — ASSESSMENT & PLAN NOTE
-Chronic, stable  -Patient has residual stomach pains and symptoms related to shingles he experienced some years ago, was placed on gabapentin by his GI which has worked tremendously well for him.  -Continue gabapentin 300 mg 3 times daily  -Refills for gabapentin sent today        [facility] Note    Chief Complaint      Chief Complaint   Patient presents with    Annual Exam       History of Present Illness      Kristina Graham is a 52 y.o. female who presents today for   Chief Complaint   Patient presents with    Annual Exam   .  Patient comes to appointment here for annual preventative visit with me .she is scheduled for full labs to be done with her endocrinologist . She has strong family history of ascvd is on lipitor currently had mammogram with  dis will get report .    Problem List Items Addressed This Visit          Other    Annual physical exam - Primary    Overview     Pe documented screening lasb will be done with her endocrinologist           Other Visit Diagnoses       Encounter for screening for cardiovascular disorders                  Past Medical History:  Past Medical History:   Diagnosis Date    Annual physical exam 2024    Arthritis     Mixed hyperlipidemia 2022    Nontoxic multinodular goiter 2017    Rheumatoid arthritis(714.0)        Past Surgical History:  Past Surgical History:   Procedure Laterality Date     SECTION, LOW TRANSVERSE      x2    REPAIR OF TENDON OF LOWER EXTREMITY Right 2023    Procedure: REPAIR, TENDON, LOWER EXTREMITY;  Surgeon: Jessee HENRIQUEZ II() MD Eugenio;  Location: [facility];  Service: Orthopedics;  Laterality: Right;    THYROIDECTOMY         Family History:  family history includes Arthritis in her mother; Asthma in her mother; Cancer in her father, maternal aunt, maternal grandmother, mother, and paternal uncle; Cataracts in her mother; Diabetes in her maternal grandmother and mother; Glaucoma in her maternal grandfather, maternal grandmother, and mother; Heart disease in her father, paternal grandfather, and paternal grandmother; Hypertension in her father, maternal grandmother, mother, paternal grandfather, paternal grandmother, paternal uncle, and sister.    Social History:  Social  History     Socioeconomic History    Marital status:    Tobacco Use    Smoking status: Never    Smokeless tobacco: Never    Tobacco comments:     drug rep   Substance and Sexual Activity    Alcohol use: Not Currently     Comment: social    Drug use: Never    Sexual activity: Yes     Partners: Male     Birth control/protection: Partner-Vasectomy     Social Determinants of Health     Financial Resource Strain: Low Risk  (12/20/2023)    Overall Financial Resource Strain (CARDIA)     Difficulty of Paying Living Expenses: Not hard at all   Food Insecurity: No Food Insecurity (12/20/2023)    Hunger Vital Sign     Worried About Running Out of Food in the Last Year: Never true     Ran Out of Food in the Last Year: Never true   Transportation Needs: No Transportation Needs (12/20/2023)    PRAPARE - Transportation     Lack of Transportation (Medical): No     Lack of Transportation (Non-Medical): No   Physical Activity: Sufficiently Active (12/20/2023)    Exercise Vital Sign     Days of Exercise per Week: 5 days     Minutes of Exercise per Session: 40 min   Stress: No Stress Concern Present (12/20/2023)    [employer] [place] [employer] - Occupational Stress Questionnaire     Feeling of Stress : Only a little   Social Connections: Unknown (12/20/2023)    Social Connection and Isolation Panel [NHANES]     Frequency of Communication with Friends and Family: More than three times a week     Frequency of Social Gatherings with Friends and Family: Three times a week     Active Member of Clubs or Organizations: No     Attends Club or Organization Meetings: Never     Marital Status:    Housing Stability: Low Risk  (12/20/2023)    Housing Stability Vital Sign     Unable to Pay for Housing in the Last Year: No     Number of Places Lived in the Last Year: 1     Unstable Housing in the Last Year: No       Review of Systems:   Review of Systems   Constitutional:  Negative for fever and weight loss.   HENT:   Negative for congestion, hearing loss and sore throat.    Eyes:  Negative for blurred vision.   Respiratory:  Negative for cough and shortness of breath.    Cardiovascular:  Negative for chest pain, palpitations, claudication and leg swelling.   Gastrointestinal:  Negative for abdominal pain, constipation, diarrhea and heartburn.   Genitourinary:  Negative for dysuria.   Musculoskeletal:  Negative for back pain and myalgias.   Skin:  Negative for rash.   Neurological:  Negative for focal weakness and headaches.   Psychiatric/Behavioral:  Negative for depression and suicidal ideas. The patient is not nervous/anxious.          Medications:  Outpatient Encounter Medications as of 2024   Medication Sig Dispense Refill    atorvastatin (LIPITOR) 10 MG tablet Take 10 mg by mouth once daily.      calcitRIOL (ROCALTROL) 0.5 MCG Cap Take 1 capsule by mouth 2 (two) times daily.      clindamycin-benzoyl peroxide gel Apply topically every morning.      cyanocobalamin 1,000 mcg/mL injection INJECT 1 ML (1,000 MCG TOTAL) INTO THE MUSCLE EVERY 30 DAYS. 3 mL 13    etodolac (LODINE XL) 400 MG 24 hr tablet TAKE 1 TABLET BY MOUTH ONCE DAILY 90 tablet 0    levothyroxine (TIROSINT) 88 mcg Cap Take 88 mcg by mouth Daily.      lifitegrast (XIIDRA) 5 % Dpet Place 1 drop into both eyes 2 (two) times daily. 60 each 11    magnesium oxide (MAG-OX) 400 mg tablet Take 400 mg by mouth once daily.      semaglutide (OZEMPIC) 1 mg/dose (4 mg/3 mL)       spironolactone (ALDACTONE) 100 MG tablet Take 1 tablet (100 mg total) by mouth once daily. 30 tablet 6    [] doxycycline (VIBRAMYCIN) 100 MG Cap Take 1 capsule (100 mg total) by mouth 2 (two) times daily. for 7 days 14 capsule 0    [] ofloxacin (FLOXIN) 0.3 % otic solution Place 5 drops into both ears 2 (two) times daily. for 7 days 5 mL 0    [] predniSONE (DELTASONE) 20 MG tablet Take 1 tablet (20 mg total) by mouth once daily. for 3 days 3 tablet 0    [DISCONTINUED]  "amoxicillin-clavulanate 875-125mg (AUGMENTIN) 875-125 mg per tablet Take 1 tablet by mouth every 12 (twelve) hours. 20 tablet 0    [DISCONTINUED] benzonatate (TESSALON) 200 MG capsule Take 1 capsule (200 mg total) by mouth 3 (three) times daily as needed for Cough. 12 capsule 0     No facility-administered encounter medications on file as of 1/19/2024.        Allergies:  Review of patient's allergies indicates:   Allergen Reactions    Bactrim [sulfamethoxazole-trimethoprim] Hives         Physical Exam         Vitals:    01/19/24 1325   BP: 130/84   Pulse: 105   Resp: 18   Temp: 97 °F (36.1 °C)         Physical Exam  Constitutional:       Appearance: She is well-developed.   Eyes:      Pupils: Pupils are equal, round, and reactive to light.   Neck:      Thyroid: No thyromegaly.   Cardiovascular:      Rate and Rhythm: Normal rate.      Heart sounds: Normal heart sounds. No murmur heard.     No friction rub. No gallop.   Pulmonary:      Breath sounds: Normal breath sounds.   Abdominal:      General: Bowel sounds are normal.      Palpations: Abdomen is soft.   Musculoskeletal:         General: Normal range of motion.      Cervical back: Normal range of motion.   Lymphadenopathy:      Cervical: No cervical adenopathy.   Skin:     General: Skin is warm.      Findings: No rash.   Neurological:      Mental Status: She is alert and oriented to person, place, and time.      Cranial Nerves: No cranial nerve deficit.   Psychiatric:         Behavior: Behavior normal.          Laboratory:  CBC:  No results for input(s): "WBC", "RBC", "HGB", "HCT", "PLT", "MCV", "MCH", "MCHC" in the last 2160 hours.  CMP:  No results for input(s): "GLU", "CALCIUM", "ALBUMIN", "PROT", "NA", "K", "CO2", "CL", "BUN", "ALKPHOS", "ALT", "AST", "BILITOT" in the last 2160 hours.    Invalid input(s): "CREATININ"  URINALYSIS:  No results for input(s): "COLORU", "CLARITYU", "SPECGRAV", "PHUR", "PROTEINUA", "GLUCOSEU", "BILIRUBINCON", "BLOODU", "WBCU", " ""RBCU", "BACTERIA", "MUCUS", "NITRITE", "LEUKOCYTESUR", "UROBILINOGEN", "HYALINECASTS" in the last 2160 hours.   LIPIDS:  No results for input(s): "TSH", "HDL", "CHOL", "TRIG", "LDLCALC", "CHOLHDL", "NONHDLCHOL", "TOTALCHOLEST" in the last 2160 hours.  TSH:  No results for input(s): "TSH" in the last 2160 hours.  A1C:  No results for input(s): "HGBA1C" in the last 2160 hours.    Radiology:        Assessment:     Kristina Graham is a 52 y.o.female with:    Annual physical exam    Encounter for screening for cardiovascular disorders  -     CT Cardiac Scoring; Future; Expected date: 01/19/2024          Plan:     Problem List Items Addressed This Visit          Other    Annual physical exam - Primary    Overview     Pe documented screening lasb will be done with her endocrinologist           Other Visit Diagnoses       Encounter for screening for cardiovascular disorders                As above, continue current medications and maintain follow up with specialists.  Return to clinic in 12 months.      Frederick W Dantagnan [facility]                  "

## 2024-12-11 ENCOUNTER — HOSPITAL ENCOUNTER (OUTPATIENT)
Dept: LAB | Facility: MEDICAL CENTER | Age: 79
End: 2024-12-11
Payer: MEDICARE

## 2024-12-11 DIAGNOSIS — R91.8 PULMONARY NODULES/LESIONS, MULTIPLE: ICD-10-CM

## 2024-12-11 DIAGNOSIS — I70.0 AORTIC CALCIFICATION (HCC): ICD-10-CM

## 2024-12-11 DIAGNOSIS — E78.5 DYSLIPIDEMIA: ICD-10-CM

## 2024-12-11 DIAGNOSIS — Z12.5 ENCOUNTER FOR SCREENING FOR MALIGNANT NEOPLASM OF PROSTATE: ICD-10-CM

## 2024-12-11 DIAGNOSIS — R53.83 FATIGUE, UNSPECIFIED TYPE: ICD-10-CM

## 2024-12-11 LAB
25(OH)D3 SERPL-MCNC: 17 NG/ML (ref 30–100)
ALBUMIN SERPL BCP-MCNC: 4.3 G/DL (ref 3.2–4.9)
ALBUMIN/GLOB SERPL: 1.4 G/DL
ALP SERPL-CCNC: 89 U/L (ref 30–99)
ALT SERPL-CCNC: 14 U/L (ref 2–50)
ANION GAP SERPL CALC-SCNC: 8 MMOL/L (ref 7–16)
AST SERPL-CCNC: 16 U/L (ref 12–45)
BASOPHILS # BLD AUTO: 0.6 % (ref 0–1.8)
BASOPHILS # BLD: 0.04 K/UL (ref 0–0.12)
BILIRUB SERPL-MCNC: 0.6 MG/DL (ref 0.1–1.5)
BUN SERPL-MCNC: 21 MG/DL (ref 8–22)
CALCIUM ALBUM COR SERPL-MCNC: 9.1 MG/DL (ref 8.5–10.5)
CALCIUM SERPL-MCNC: 9.3 MG/DL (ref 8.5–10.5)
CHLORIDE SERPL-SCNC: 100 MMOL/L (ref 96–112)
CHOLEST SERPL-MCNC: 198 MG/DL (ref 100–199)
CO2 SERPL-SCNC: 28 MMOL/L (ref 20–33)
CREAT SERPL-MCNC: 1.12 MG/DL (ref 0.5–1.4)
EOSINOPHIL # BLD AUTO: 0.23 K/UL (ref 0–0.51)
EOSINOPHIL NFR BLD: 3.5 % (ref 0–6.9)
ERYTHROCYTE [DISTWIDTH] IN BLOOD BY AUTOMATED COUNT: 51.6 FL (ref 35.9–50)
FASTING STATUS PATIENT QL REPORTED: NORMAL
GFR SERPLBLD CREATININE-BSD FMLA CKD-EPI: 66 ML/MIN/1.73 M 2
GLOBULIN SER CALC-MCNC: 3.1 G/DL (ref 1.9–3.5)
GLUCOSE SERPL-MCNC: 113 MG/DL (ref 65–99)
HCT VFR BLD AUTO: 50.1 % (ref 42–52)
HDLC SERPL-MCNC: 43 MG/DL
HGB BLD-MCNC: 16.6 G/DL (ref 14–18)
IMM GRANULOCYTES # BLD AUTO: 0.04 K/UL (ref 0–0.11)
IMM GRANULOCYTES NFR BLD AUTO: 0.6 % (ref 0–0.9)
LDLC SERPL CALC-MCNC: 133 MG/DL
LYMPHOCYTES # BLD AUTO: 1.21 K/UL (ref 1–4.8)
LYMPHOCYTES NFR BLD: 18.3 % (ref 22–41)
MCH RBC QN AUTO: 30.2 PG (ref 27–33)
MCHC RBC AUTO-ENTMCNC: 33.1 G/DL (ref 32.3–36.5)
MCV RBC AUTO: 91.3 FL (ref 81.4–97.8)
MONOCYTES # BLD AUTO: 0.88 K/UL (ref 0–0.85)
MONOCYTES NFR BLD AUTO: 13.3 % (ref 0–13.4)
NEUTROPHILS # BLD AUTO: 4.22 K/UL (ref 1.82–7.42)
NEUTROPHILS NFR BLD: 63.7 % (ref 44–72)
NRBC # BLD AUTO: 0 K/UL
NRBC BLD-RTO: 0 /100 WBC (ref 0–0.2)
PLATELET # BLD AUTO: 228 K/UL (ref 164–446)
PMV BLD AUTO: 11.4 FL (ref 9–12.9)
POTASSIUM SERPL-SCNC: 4.9 MMOL/L (ref 3.6–5.5)
PROT SERPL-MCNC: 7.4 G/DL (ref 6–8.2)
PSA SERPL DL<=0.01 NG/ML-MCNC: 0.61 NG/ML (ref 0–4)
RBC # BLD AUTO: 5.49 M/UL (ref 4.7–6.1)
SODIUM SERPL-SCNC: 136 MMOL/L (ref 135–145)
TRIGL SERPL-MCNC: 108 MG/DL (ref 0–149)
TSH SERPL DL<=0.005 MIU/L-ACNC: 2.36 UIU/ML (ref 0.38–5.33)
WBC # BLD AUTO: 6.6 K/UL (ref 4.8–10.8)

## 2024-12-11 PROCEDURE — 80061 LIPID PANEL: CPT

## 2024-12-11 PROCEDURE — 85025 COMPLETE CBC W/AUTO DIFF WBC: CPT

## 2024-12-11 PROCEDURE — 82306 VITAMIN D 25 HYDROXY: CPT

## 2024-12-11 PROCEDURE — 84443 ASSAY THYROID STIM HORMONE: CPT

## 2024-12-11 PROCEDURE — 84153 ASSAY OF PSA TOTAL: CPT

## 2024-12-11 PROCEDURE — 80053 COMPREHEN METABOLIC PANEL: CPT

## 2024-12-11 PROCEDURE — 36415 COLL VENOUS BLD VENIPUNCTURE: CPT

## 2024-12-23 ENCOUNTER — HOSPITAL ENCOUNTER (OUTPATIENT)
Dept: RADIOLOGY | Facility: MEDICAL CENTER | Age: 79
End: 2024-12-23
Payer: MEDICARE

## 2024-12-23 DIAGNOSIS — Z72.0 TOBACCO USE: ICD-10-CM

## 2024-12-23 DIAGNOSIS — R91.8 PULMONARY NODULES/LESIONS, MULTIPLE: ICD-10-CM

## 2024-12-23 DIAGNOSIS — R05.3 CHRONIC COUGH: ICD-10-CM

## 2024-12-23 PROCEDURE — 71250 CT THORAX DX C-: CPT

## 2024-12-26 DIAGNOSIS — R91.1 LUNG NODULE SEEN ON IMAGING STUDY: ICD-10-CM

## 2024-12-26 NOTE — PROGRESS NOTES
Patient with lung nodule that has changed in interval imaging.  Pulmonary lung nodule clinic referral sent.

## 2025-01-02 ENCOUNTER — OFFICE VISIT (OUTPATIENT)
Dept: MEDICAL GROUP | Facility: PHYSICIAN GROUP | Age: 80
End: 2025-01-02
Payer: MEDICARE

## 2025-01-02 VITALS
HEIGHT: 71 IN | DIASTOLIC BLOOD PRESSURE: 60 MMHG | WEIGHT: 199.3 LBS | HEART RATE: 76 BPM | OXYGEN SATURATION: 97 % | TEMPERATURE: 97.4 F | SYSTOLIC BLOOD PRESSURE: 116 MMHG | BODY MASS INDEX: 27.9 KG/M2

## 2025-01-02 DIAGNOSIS — R91.8 PULMONARY NODULES/LESIONS, MULTIPLE: ICD-10-CM

## 2025-01-02 DIAGNOSIS — E55.9 VITAMIN D DEFICIENCY: ICD-10-CM

## 2025-01-02 PROCEDURE — 3078F DIAST BP <80 MM HG: CPT

## 2025-01-02 PROCEDURE — 99214 OFFICE O/P EST MOD 30 MIN: CPT

## 2025-01-02 PROCEDURE — 3074F SYST BP LT 130 MM HG: CPT

## 2025-01-02 ASSESSMENT — ENCOUNTER SYMPTOMS
WEIGHT LOSS: 0
HEADACHES: 0
CONSTIPATION: 0
COUGH: 0
DEPRESSION: 0
HEARTBURN: 0
BACK PAIN: 0
DIARRHEA: 0
INSOMNIA: 0

## 2025-01-02 ASSESSMENT — PATIENT HEALTH QUESTIONNAIRE - PHQ9: CLINICAL INTERPRETATION OF PHQ2 SCORE: 0

## 2025-01-02 ASSESSMENT — FIBROSIS 4 INDEX: FIB4 SCORE: 1.5

## 2025-01-02 NOTE — ASSESSMENT & PLAN NOTE
-Chronic, new diagnosis with most recent value at 17 indicating deficiency  -Patient will begin taking over-the-counter vitamin D supplementation  -Will repeat blood work for this in 6 months to decide if more robust vitamin D supplementation is necessary

## 2025-01-02 NOTE — PROGRESS NOTES
"Subjective:     CC: Lab review and recent CT imaging results    HPI:   Jose presents today for lab review and review of recent CT imaging results.  I reviewed the recent CT imaging findings with the patient and explained that there was a noted slight expansion of some of his pulmonary nodules.  I offered some reassurance that nothing was seen that was immediately life-threatening, however I did describe the risks associated with expanding nodules and justification for having him follow-up with a pulmonary nodule clinic for which I have sent a referral.  Patient's questions and concerns were answered regarding this topic.    Of note patient stated that since his last visit he has quit smoking using nicotine patches.  He continues that he has noticed a significant reduction in his morning coughing symptoms.  He plans to maintain this to reduce his risk in the future.    Reviewed patient's recent blood work results which were grossly normal aside from a low vitamin D.  This is a new diagnosis for the patient and I explained the physiologic role of vitamin D and bone health and mood regulation.  Patient stated he would begin taking a over-the-counter vitamin D supplement.    Problem   Vitamin D Deficiency    Most recent Vitamin D = 17 (12/2024)  Patient to begin supplementation     Pulmonary Nodules/Lesions, Multiple    Seen on lung cancer screening CT 5/2022  Slight expansion in size of nodule seen on CT completed 12/2024  Referral sent to Dr. Hoffman at [facility]  \"Pulmonary Nodule Clinic\"         ROS:  Review of Systems   Constitutional:  Negative for weight loss.   Respiratory:  Negative for cough.    Cardiovascular:  Negative for leg swelling.   Gastrointestinal:  Negative for constipation, diarrhea and heartburn.   Genitourinary:  Negative for dysuria, frequency and urgency.   Musculoskeletal:  Negative for back pain.   Neurological:  Negative for headaches.   Psychiatric/Behavioral:  Negative " "for depression. The patient does not have insomnia.        Objective:     Exam:  /60 (BP Location: Right arm, Patient Position: Sitting, BP Cuff Size: Adult)   Pulse 76   Temp 36.3 °C (97.4 °F) (Temporal)   Ht 1.803 m (5' 11\")   Wt 90.4 kg (199 lb 4.7 oz)   SpO2 97%   BMI 27.80 kg/m²  Body mass index is 27.8 kg/m².    Physical Exam  Constitutional:       Appearance: Normal appearance.   HENT:      Right Ear: Tympanic membrane and ear canal normal.      Left Ear: Tympanic membrane and ear canal normal.      Mouth/Throat:      Mouth: Mucous membranes are moist.      Pharynx: Oropharynx is clear.   Eyes:      Conjunctiva/sclera: Conjunctivae normal.      Pupils: Pupils are equal, round, and reactive to light.   Cardiovascular:      Rate and Rhythm: Normal rate and regular rhythm.   Pulmonary:      Effort: No respiratory distress.      Breath sounds: No wheezing.   Abdominal:      General: There is no distension.      Tenderness: There is no abdominal tenderness.   Musculoskeletal:         General: No swelling.      Cervical back: Normal range of motion.      Right lower leg: No edema.      Left lower leg: No edema.   Skin:     General: Skin is warm and dry.      Capillary Refill: Capillary refill takes less than 2 seconds.      Findings: No rash.   Neurological:      General: No focal deficit present.      Mental Status: He is alert and oriented to person, place, and time.      Deep Tendon Reflexes: Reflexes normal.   Psychiatric:         Mood and Affect: Mood normal.             Assessment & Plan:     79 y.o. male with the following -     Assessment & Plan  Pulmonary nodules/lesions, multiple  -Chronic, patient recently referred to pulmonary nodule clinic  -Explained the patient that these nodules will likely require further follow-up whether that might be additional imaging or biopsy  -Patient to call and schedule an appointment  -Explained to patient that we will keep in touch with him related to the " treatment plans that are formulated with the specialty clinic  -Applauded patient on his smoking cessation efforts and encouraged continued abstinence from tobacco use.  Offered to write prescriptions as needed if the patient requires  -Follow-up as needed       Vitamin D deficiency  -Chronic, new diagnosis with most recent value at 17 indicating deficiency  -Patient will begin taking over-the-counter vitamin D supplementation  -Will repeat blood work for this in 6 months to decide if more robust vitamin D supplementation is necessary             No follow-ups on file.    Please note that this dictation was created using voice recognition software. I have made every reasonable attempt to correct obvious errors, but I expect that there are errors of grammar and possibly content that I did not discover before finalizing the note.      Mino Chavez D.O.

## 2025-01-02 NOTE — ASSESSMENT & PLAN NOTE
-Chronic, patient recently referred to pulmonary nodule clinic  -Explained the patient that these nodules will likely require further follow-up whether that might be additional imaging or biopsy  -Patient to call and schedule an appointment  -Explained to patient that we will keep in touch with him related to the treatment plans that are formulated with the specialty clinic  -Applauded patient on his smoking cessation efforts and encouraged continued abstinence from tobacco use.  Offered to write prescriptions as needed if the patient requires  -Follow-up as needed

## 2025-01-07 NOTE — PROGRESS NOTES
"Subjective:     CC:  Diagnoses of Pulmonary nodules/lesions, multiple and Cigarette nicotine dependence in remission were pertinent to this visit.    HISTORY OF THE PRESENT ILLNESS: Patient is a 79 y.o. male. This pleasant patient is here today to establish care in the Lung Nodule Clinic and discuss pulmonary nodules. His referring provider is Dr. Mino Chavez.    Pulmonary nodules- Gene is an 79M former smoker (31.5 pack year history, stopped smoking 12/2024) who has HLD and HTN who is presenting for evaluation of pulmonary nodules.  He had a CT chest on 12/23/24 due to worsening cough and his CT showed,     \"Lungs: A 5 x 7 mm subpleural nodule laterally in the right lung apex (image 30) measures slightly larger a few other tiny densities , previously 4 x 6 mm. Technique is different which may account for some of the difference. A 3 mm subpleural nodule   posterolaterally in the left lower lobe below the hilar level appears larger also (image 77). Mild scattered coarse lung markings elsewhere are stable.\"  This CT was compared to his CT-lung cancer screening which was done on 5/16/2022.    His father had lung cancer (was a smoker). His paternal grandmother had leukemia.  No family hx of lung disease.  He has had significant second hand tobacco smoke exposure.  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  He currently is a  twice a week.  He was born in [place], moved to [place] for 15 years and now lives [place].  He had possible asbestoses exposure when he worked in the [employer].  Otherwise no known exposure to dust, fumes, solvents, gases, tuberculosis, radon, chemicals, radiation, or asbestos.  He has no history of hospitalization for respiratory problems or been on a ventilator.  He has no personal hx of asthma, pneumonia, emphysema, or COPD.    He has had no recent fevers, chills, weight loss.  His energy level is normal.  No chest pain or " palpitations.  He had a chronic cough for 6 months which resolved when he stopped smoking cigarettes.  No sputum or hemoptysis.  No SOB or wheezing.  His energy level is normal.      Allergies: Patient has no known allergies.    Current Outpatient Medications Ordered in Epic   Medication Sig Dispense Refill    gabapentin (NEURONTIN) 300 MG Cap Take 300 mg by mouth 3 times a day.      nicotine (NICODERM) 7 MG/24HR PATCH 24 HR Place 1 Patch on the skin every 24 hours. 30 Patch 3    melatonin 3 MG Tab Take 3 mg by mouth at bedtime.      acetaminophen (TYLENOL) 325 MG Tab Take 325 mg by mouth.       No current Epic-ordered facility-administered medications on file.       No past medical history on file.    Past Surgical History:   Procedure Laterality Date    [age]OMY      5-7 yo       Social History     Tobacco Use    Smoking status: Former     Current packs/day: 0.00     Average packs/day: 0.5 packs/day for 62.9 years (31.5 ttl pk-yrs)     Types: Cigarettes     Start date:      Quit date: 2024     Years since quittin.0    Smokeless tobacco: Never    Tobacco comments:     Cigarettes: 1/2 ppd -2022;  5-6 cigarettes/day x12022   Vaping Use    Vaping status: Never Used   Substance Use Topics    Alcohol use: Yes     Alcohol/week: 0.6 oz     Types: 1 Cans of beer per week     Comment: couple times every two weeks    Drug use: Never       Social History     Social History Narrative    Not on file       Family History   Problem Relation Age of Onset    Stroke Mother     Heart Disease Mother     Cancer Father 77        Lung cancer     Breast Cancer Sister 65        Mastectomy    No Known Problems Sister     No Known Problems Brother     No Known Problems Brother     No Known Problems Brother     No Known Problems Brother     No Known Problems Maternal Grandmother     Dementia Maternal Grandfather     Cancer Paternal Grandmother         Leukemia    No Known Problems Paternal Grandfather     Lung  Disease Neg Hx        Health Maintenance: vaccines UTD    ROS:   Gen: no fevers/chills, no changes in weight  ENT: no bloody nose  Pulm: no sob, no cough  CV: no chest pain, no palpitations  GI: no nausea/vomiting, no diarrhea  : no dysuria  MSk: no myalgias  Skin: no rash  Neuro: no headaches, no numbness/tingling  Heme/Lymph: no abnormal bleeding      Objective:     Exam: /70 (BP Location: Right arm, Patient Position: Sitting, BP Cuff Size: Adult)   Pulse 81   Resp 15   Ht 1.829 m (6')   Wt 89.8 kg (198 lb)   SpO2 97%  Body mass index is 26.85 kg/m².    General: Normal appearing. No distress.  HEENT: Normocephalic.     Eyes: Eyes conjunctiva clear lids without ptosis  Neck: Supple. Thyroid is not enlarged.  Pulmonary: Clear to ausculation.  Normal effort. No rales, ronchi, or wheezing.  Cardiovascular: Regular rate and rhythm without murmur.   Abdomen: Soft, nontender, nondistended. Normal bowel sounds.   Neurologic: No gross motor deficits  Lymph: No cervical or supraclavicular lymph nodes are palpable  Skin: Warm and dry.  No obvious lesions.  Musculoskeletal: No extremity cyanosis, clubbing, or edema.  Psych: Normal mood and affect. Alert and oriented. Judgment and insight is normal.      Assessment & Plan:   79 y.o. male with the following -    1. Pulmonary nodules/lesions, multiple  Chronic, asymptomatic.  We reviewed Jose's CT images from 5/16/22 and 12/23/24 together today.  We discussed that lung nodules can be due to inflammation, scarring, infection, tumor/mass.  His lung nodules have has minimal change 5/16/22-12/23/24 and these changes could be due to difference in imaging technique and given the size and appearance of the nodules, they are likely benign.  However, given his smoking and family history, recommended continued serial imaging with a follow-up CT in 1 year (due around 12/24/25).  Encouraged avoidance of tobacco products and lung irritants.  Follow-up and red flag precautions  given. Patient expressed understanding and agreement with plan.  - CT-CHEST (THORAX) W/O; Future    2. Cigarette nicotine dependence in remission  Chronic, improved.  Of note, his previous cough has resolved with stopping smoking.  He quit smoking 1 month ago.  Encouraged continued avoidance of tobacco products.      Return in about 1 year (around 1/8/2026) for follow-up lung nodules after CT obtained.    Please note that this dictation was created using voice recognition software. I have made every reasonable attempt to correct obvious errors, but I expect that there are errors of grammar and possibly content that I did not discover before finalizing the note.

## 2025-01-08 ENCOUNTER — OFFICE VISIT (OUTPATIENT)
Dept: SLEEP MEDICINE | Facility: MEDICAL CENTER | Age: 80
End: 2025-01-08
Attending: FAMILY MEDICINE
Payer: MEDICARE

## 2025-01-08 VITALS
DIASTOLIC BLOOD PRESSURE: 70 MMHG | OXYGEN SATURATION: 97 % | HEIGHT: 72 IN | WEIGHT: 198 LBS | BODY MASS INDEX: 26.82 KG/M2 | RESPIRATION RATE: 15 BRPM | SYSTOLIC BLOOD PRESSURE: 130 MMHG | HEART RATE: 81 BPM

## 2025-01-08 DIAGNOSIS — R91.8 PULMONARY NODULES/LESIONS, MULTIPLE: ICD-10-CM

## 2025-01-08 DIAGNOSIS — F17.211 CIGARETTE NICOTINE DEPENDENCE IN REMISSION: ICD-10-CM

## 2025-01-08 PROCEDURE — 99213 OFFICE O/P EST LOW 20 MIN: CPT | Performed by: FAMILY MEDICINE

## 2025-01-08 PROCEDURE — 3075F SYST BP GE 130 - 139MM HG: CPT | Performed by: FAMILY MEDICINE

## 2025-01-08 PROCEDURE — 3078F DIAST BP <80 MM HG: CPT | Performed by: FAMILY MEDICINE

## 2025-01-08 PROCEDURE — 99212 OFFICE O/P EST SF 10 MIN: CPT | Performed by: FAMILY MEDICINE

## 2025-01-08 ASSESSMENT — FIBROSIS 4 INDEX: FIB4 SCORE: 1.5

## 2025-01-08 NOTE — Clinical Note
It was a pleasure seeing Gene in the Lung nodule clinic.  I have attached the encounter note for your review. -Dr. Eva Hoffman

## 2025-02-12 ENCOUNTER — PATIENT MESSAGE (OUTPATIENT)
Dept: MEDICAL GROUP | Facility: PHYSICIAN GROUP | Age: 80
End: 2025-02-12
Payer: MEDICARE

## 2025-02-12 NOTE — TELEPHONE ENCOUNTER
Received request via: Patient    Was the patient seen in the last year in this department? Yes    Does the patient have an active prescription (recently filled or refills available) for medication(s) requested? No    Pharmacy Name: [facility]    Does the patient have [facility] and need 100-day supply? (This applies to ALL medications) Yes, quantity updated to 100 days

## 2025-02-14 RX ORDER — GABAPENTIN 300 MG/1
300 CAPSULE ORAL 3 TIMES DAILY
Qty: 100 CAPSULE | Refills: 0 | Status: SHIPPED | OUTPATIENT
Start: 2025-02-14

## 2025-05-20 RX ORDER — GABAPENTIN 300 MG/1
300 CAPSULE ORAL 3 TIMES DAILY
Qty: 100 CAPSULE | Refills: 0 | Status: SHIPPED | OUTPATIENT
Start: 2025-05-20

## 2025-06-05 ENCOUNTER — OFFICE VISIT (OUTPATIENT)
Dept: MEDICAL GROUP | Facility: PHYSICIAN GROUP | Age: 80
End: 2025-06-05
Payer: MEDICARE

## 2025-06-05 VITALS
BODY MASS INDEX: 27.32 KG/M2 | DIASTOLIC BLOOD PRESSURE: 78 MMHG | HEIGHT: 72 IN | OXYGEN SATURATION: 96 % | SYSTOLIC BLOOD PRESSURE: 126 MMHG | TEMPERATURE: 98.2 F | RESPIRATION RATE: 18 BRPM | WEIGHT: 201.72 LBS | HEART RATE: 80 BPM

## 2025-06-05 DIAGNOSIS — H91.93 DECREASED HEARING OF BOTH EARS: Primary | ICD-10-CM

## 2025-06-05 DIAGNOSIS — E78.5 DYSLIPIDEMIA: ICD-10-CM

## 2025-06-05 DIAGNOSIS — E55.9 VITAMIN D DEFICIENCY: ICD-10-CM

## 2025-06-05 DIAGNOSIS — Z23 NEED FOR VACCINATION: ICD-10-CM

## 2025-06-05 DIAGNOSIS — R73.01 ELEVATED FASTING BLOOD SUGAR: ICD-10-CM

## 2025-06-05 PROCEDURE — 3074F SYST BP LT 130 MM HG: CPT

## 2025-06-05 PROCEDURE — 90677 PCV20 VACCINE IM: CPT

## 2025-06-05 PROCEDURE — 3078F DIAST BP <80 MM HG: CPT

## 2025-06-05 PROCEDURE — 99213 OFFICE O/P EST LOW 20 MIN: CPT | Mod: 25

## 2025-06-05 PROCEDURE — G0009 ADMIN PNEUMOCOCCAL VACCINE: HCPCS

## 2025-06-05 ASSESSMENT — FIBROSIS 4 INDEX: FIB4 SCORE: 1.5

## 2025-06-23 NOTE — TELEPHONE ENCOUNTER
Received request via: Pharmacy    Was the patient seen in the last year in this department? Yes    Does the patient have an active prescription (recently filled or refills available) for medication(s) requested? No    Pharmacy Name[facility]    Does the patient have [facility] and need 100-day supply? (This applies to ALL medications) Yes, quantity updated to 100 days

## 2025-06-24 RX ORDER — GABAPENTIN 300 MG/1
300 CAPSULE ORAL 3 TIMES DAILY
Qty: 300 CAPSULE | Refills: 0 | Status: SHIPPED | OUTPATIENT
Start: 2025-06-24